# Patient Record
Sex: FEMALE | Race: WHITE | Employment: OTHER | URBAN - METROPOLITAN AREA
[De-identification: names, ages, dates, MRNs, and addresses within clinical notes are randomized per-mention and may not be internally consistent; named-entity substitution may affect disease eponyms.]

---

## 2018-03-15 ENCOUNTER — APPOINTMENT (OUTPATIENT)
Dept: CT IMAGING | Age: 73
DRG: 189 | End: 2018-03-15
Payer: MEDICARE

## 2018-03-15 ENCOUNTER — APPOINTMENT (OUTPATIENT)
Dept: GENERAL RADIOLOGY | Age: 73
DRG: 189 | End: 2018-03-15
Payer: MEDICARE

## 2018-03-15 ENCOUNTER — HOSPITAL ENCOUNTER (INPATIENT)
Age: 73
LOS: 3 days | Discharge: HOME | DRG: 189 | End: 2018-03-18
Attending: EMERGENCY MEDICINE | Admitting: INTERNAL MEDICINE
Payer: MEDICARE

## 2018-03-15 DIAGNOSIS — T81.82XA SUBCUTANEOUS EMPHYSEMA RESULTING FROM A PROCEDURE, INITIAL ENCOUNTER: ICD-10-CM

## 2018-03-15 DIAGNOSIS — R09.02 HYPOXIA: Primary | ICD-10-CM

## 2018-03-15 DIAGNOSIS — J18.9 PNEUMONIA DUE TO ORGANISM: ICD-10-CM

## 2018-03-15 PROBLEM — J96.01 ACUTE HYPOXEMIC RESPIRATORY FAILURE (HCC): Status: ACTIVE | Noted: 2018-03-15

## 2018-03-15 PROBLEM — J43.9 EMPHYSEMA LUNG (HCC): Status: ACTIVE | Noted: 2018-03-15

## 2018-03-15 LAB
ALBUMIN SERPL-MCNC: 3.9 G/DL (ref 3.5–5.2)
ALP BLD-CCNC: 70 U/L (ref 35–104)
ALT SERPL-CCNC: 21 U/L (ref 0–32)
ANION GAP SERPL CALCULATED.3IONS-SCNC: 12 MMOL/L (ref 7–16)
AST SERPL-CCNC: 34 U/L (ref 0–31)
BACTERIA: NORMAL /HPF
BASOPHILS ABSOLUTE: 0.04 E9/L (ref 0–0.2)
BASOPHILS RELATIVE PERCENT: 0.7 % (ref 0–2)
BILIRUB SERPL-MCNC: 0.2 MG/DL (ref 0–1.2)
BILIRUBIN URINE: NEGATIVE
BLOOD, URINE: NEGATIVE
BUN BLDV-MCNC: 8 MG/DL (ref 8–23)
CALCIUM SERPL-MCNC: 8.7 MG/DL (ref 8.6–10.2)
CHLORIDE BLD-SCNC: 97 MMOL/L (ref 98–107)
CLARITY: CLEAR
CO2: 28 MMOL/L (ref 22–29)
COLOR: YELLOW
CREAT SERPL-MCNC: 0.7 MG/DL (ref 0.5–1)
D DIMER: 315 NG/ML DDU
EKG ATRIAL RATE: 81 BPM
EKG P AXIS: 65 DEGREES
EKG P-R INTERVAL: 132 MS
EKG Q-T INTERVAL: 400 MS
EKG QRS DURATION: 128 MS
EKG QTC CALCULATION (BAZETT): 464 MS
EKG R AXIS: -72 DEGREES
EKG T AXIS: 54 DEGREES
EKG VENTRICULAR RATE: 81 BPM
EOSINOPHILS ABSOLUTE: 0.04 E9/L (ref 0.05–0.5)
EOSINOPHILS RELATIVE PERCENT: 0.7 % (ref 0–6)
EPITHELIAL CELLS, UA: NORMAL /HPF
GFR AFRICAN AMERICAN: >60
GFR NON-AFRICAN AMERICAN: >60 ML/MIN/1.73
GLUCOSE BLD-MCNC: 107 MG/DL (ref 74–109)
GLUCOSE URINE: NEGATIVE MG/DL
HCT VFR BLD CALC: 40 % (ref 34–48)
HEMOGLOBIN: 12.3 G/DL (ref 11.5–15.5)
IMMATURE GRANULOCYTES #: 0.02 E9/L
IMMATURE GRANULOCYTES %: 0.4 % (ref 0–5)
KETONES, URINE: ABNORMAL MG/DL
LACTIC ACID: 1.4 MMOL/L (ref 0.5–2.2)
LEUKOCYTE ESTERASE, URINE: ABNORMAL
LYMPHOCYTES ABSOLUTE: 0.91 E9/L (ref 1.5–4)
LYMPHOCYTES RELATIVE PERCENT: 16.3 % (ref 20–42)
MCH RBC QN AUTO: 26.7 PG (ref 26–35)
MCHC RBC AUTO-ENTMCNC: 30.8 % (ref 32–34.5)
MCV RBC AUTO: 86.8 FL (ref 80–99.9)
MONOCYTES ABSOLUTE: 0.61 E9/L (ref 0.1–0.95)
MONOCYTES RELATIVE PERCENT: 10.9 % (ref 2–12)
NEUTROPHILS ABSOLUTE: 3.98 E9/L (ref 1.8–7.3)
NEUTROPHILS RELATIVE PERCENT: 71 % (ref 43–80)
NITRITE, URINE: NEGATIVE
PDW BLD-RTO: 16 FL (ref 11.5–15)
PH UA: 5.5 (ref 5–9)
PLATELET # BLD: 307 E9/L (ref 130–450)
PMV BLD AUTO: 10.3 FL (ref 7–12)
POTASSIUM SERPL-SCNC: 3.8 MMOL/L (ref 3.5–5)
PRO-BNP: 135 PG/ML (ref 0–125)
PROTEIN UA: NEGATIVE MG/DL
RBC # BLD: 4.61 E12/L (ref 3.5–5.5)
RBC UA: NORMAL /HPF (ref 0–2)
SODIUM BLD-SCNC: 137 MMOL/L (ref 132–146)
SPECIFIC GRAVITY UA: 1.01 (ref 1–1.03)
TOTAL PROTEIN: 7.6 G/DL (ref 6.4–8.3)
TROPONIN: <0.01 NG/ML (ref 0–0.03)
UROBILINOGEN, URINE: 0.2 E.U./DL
WBC # BLD: 5.6 E9/L (ref 4.5–11.5)
WBC UA: NORMAL /HPF (ref 0–5)

## 2018-03-15 PROCEDURE — 87581 M.PNEUMON DNA AMP PROBE: CPT

## 2018-03-15 PROCEDURE — 93005 ELECTROCARDIOGRAM TRACING: CPT | Performed by: PHYSICIAN ASSISTANT

## 2018-03-15 PROCEDURE — 2060000000 HC ICU INTERMEDIATE R&B

## 2018-03-15 PROCEDURE — 87186 SC STD MICRODIL/AGAR DIL: CPT

## 2018-03-15 PROCEDURE — 36415 COLL VENOUS BLD VENIPUNCTURE: CPT

## 2018-03-15 PROCEDURE — 6360000002 HC RX W HCPCS: Performed by: EMERGENCY MEDICINE

## 2018-03-15 PROCEDURE — 71275 CT ANGIOGRAPHY CHEST: CPT

## 2018-03-15 PROCEDURE — 87040 BLOOD CULTURE FOR BACTERIA: CPT

## 2018-03-15 PROCEDURE — 6370000000 HC RX 637 (ALT 250 FOR IP): Performed by: EMERGENCY MEDICINE

## 2018-03-15 PROCEDURE — 6360000002 HC RX W HCPCS: Performed by: INTERNAL MEDICINE

## 2018-03-15 PROCEDURE — 6370000000 HC RX 637 (ALT 250 FOR IP): Performed by: INTERNAL MEDICINE

## 2018-03-15 PROCEDURE — 87503 INFLUENZA DNA AMP PROB ADDL: CPT

## 2018-03-15 PROCEDURE — 94664 DEMO&/EVAL PT USE INHALER: CPT

## 2018-03-15 PROCEDURE — 87088 URINE BACTERIA CULTURE: CPT

## 2018-03-15 PROCEDURE — 83880 ASSAY OF NATRIURETIC PEPTIDE: CPT

## 2018-03-15 PROCEDURE — 87486 CHLMYD PNEUM DNA AMP PROBE: CPT

## 2018-03-15 PROCEDURE — 81001 URINALYSIS AUTO W/SCOPE: CPT

## 2018-03-15 PROCEDURE — 85025 COMPLETE CBC W/AUTO DIFF WBC: CPT

## 2018-03-15 PROCEDURE — 2580000003 HC RX 258: Performed by: EMERGENCY MEDICINE

## 2018-03-15 PROCEDURE — 6360000004 HC RX CONTRAST MEDICATION: Performed by: RADIOLOGY

## 2018-03-15 PROCEDURE — 71045 X-RAY EXAM CHEST 1 VIEW: CPT

## 2018-03-15 PROCEDURE — 85378 FIBRIN DEGRADE SEMIQUANT: CPT

## 2018-03-15 PROCEDURE — 87501 INFLUENZA DNA AMP PROB 1+: CPT

## 2018-03-15 PROCEDURE — 80053 COMPREHEN METABOLIC PANEL: CPT

## 2018-03-15 PROCEDURE — 83605 ASSAY OF LACTIC ACID: CPT

## 2018-03-15 PROCEDURE — 87798 DETECT AGENT NOS DNA AMP: CPT

## 2018-03-15 PROCEDURE — 96365 THER/PROPH/DIAG IV INF INIT: CPT

## 2018-03-15 PROCEDURE — 2580000003 HC RX 258: Performed by: INTERNAL MEDICINE

## 2018-03-15 PROCEDURE — 87077 CULTURE AEROBIC IDENTIFY: CPT

## 2018-03-15 PROCEDURE — 84484 ASSAY OF TROPONIN QUANT: CPT

## 2018-03-15 PROCEDURE — 99285 EMERGENCY DEPT VISIT HI MDM: CPT

## 2018-03-15 RX ORDER — ONDANSETRON 2 MG/ML
4 INJECTION INTRAMUSCULAR; INTRAVENOUS EVERY 6 HOURS PRN
Status: DISCONTINUED | OUTPATIENT
Start: 2018-03-15 | End: 2018-03-18 | Stop reason: HOSPADM

## 2018-03-15 RX ORDER — GUAIFENESIN 100 MG/5ML
200 SOLUTION ORAL EVERY 4 HOURS PRN
COMMUNITY
End: 2019-03-27

## 2018-03-15 RX ORDER — METHYLPREDNISOLONE SODIUM SUCCINATE 40 MG/ML
40 INJECTION, POWDER, LYOPHILIZED, FOR SOLUTION INTRAMUSCULAR; INTRAVENOUS EVERY 6 HOURS
Status: DISCONTINUED | OUTPATIENT
Start: 2018-03-15 | End: 2018-03-16

## 2018-03-15 RX ORDER — ACETAMINOPHEN 325 MG/1
650 TABLET ORAL EVERY 4 HOURS PRN
Status: DISCONTINUED | OUTPATIENT
Start: 2018-03-15 | End: 2018-03-18 | Stop reason: HOSPADM

## 2018-03-15 RX ORDER — ASPIRIN 81 MG/1
81 TABLET ORAL EVERY OTHER DAY
Status: DISCONTINUED | OUTPATIENT
Start: 2018-03-16 | End: 2018-03-18 | Stop reason: HOSPADM

## 2018-03-15 RX ORDER — GUAIFENESIN 100 MG/5ML
200 SOLUTION ORAL EVERY 4 HOURS PRN
Status: DISCONTINUED | OUTPATIENT
Start: 2018-03-15 | End: 2018-03-18 | Stop reason: HOSPADM

## 2018-03-15 RX ORDER — SODIUM CHLORIDE 0.9 % (FLUSH) 0.9 %
10 SYRINGE (ML) INJECTION PRN
Status: DISCONTINUED | OUTPATIENT
Start: 2018-03-15 | End: 2018-03-18 | Stop reason: HOSPADM

## 2018-03-15 RX ORDER — CHOLECALCIFEROL (VITAMIN D3) 50 MCG
4000 TABLET ORAL EVERY MORNING
Status: DISCONTINUED | OUTPATIENT
Start: 2018-03-16 | End: 2018-03-18 | Stop reason: HOSPADM

## 2018-03-15 RX ORDER — ASPIRIN 81 MG/1
81 TABLET ORAL EVERY OTHER DAY
COMMUNITY
End: 2019-06-06

## 2018-03-15 RX ORDER — ACETAMINOPHEN 160 MG
2 TABLET,DISINTEGRATING ORAL EVERY MORNING
COMMUNITY

## 2018-03-15 RX ORDER — IPRATROPIUM BROMIDE AND ALBUTEROL SULFATE 2.5; .5 MG/3ML; MG/3ML
1 SOLUTION RESPIRATORY (INHALATION) ONCE
Status: COMPLETED | OUTPATIENT
Start: 2018-03-15 | End: 2018-03-15

## 2018-03-15 RX ORDER — ESTRADIOL 2 MG/1
2 TABLET ORAL EVERY MORNING
Status: DISCONTINUED | OUTPATIENT
Start: 2018-03-16 | End: 2018-03-18 | Stop reason: HOSPADM

## 2018-03-15 RX ORDER — IPRATROPIUM BROMIDE AND ALBUTEROL SULFATE 2.5; .5 MG/3ML; MG/3ML
1 SOLUTION RESPIRATORY (INHALATION)
Status: DISCONTINUED | OUTPATIENT
Start: 2018-03-15 | End: 2018-03-18 | Stop reason: HOSPADM

## 2018-03-15 RX ORDER — SODIUM CHLORIDE 0.9 % (FLUSH) 0.9 %
10 SYRINGE (ML) INJECTION EVERY 12 HOURS SCHEDULED
Status: DISCONTINUED | OUTPATIENT
Start: 2018-03-15 | End: 2018-03-18 | Stop reason: HOSPADM

## 2018-03-15 RX ORDER — ESTRADIOL 2 MG/1
2 TABLET ORAL EVERY MORNING
COMMUNITY
End: 2019-06-06

## 2018-03-15 RX ORDER — 0.9 % SODIUM CHLORIDE 0.9 %
1000 INTRAVENOUS SOLUTION INTRAVENOUS ONCE
Status: COMPLETED | OUTPATIENT
Start: 2018-03-15 | End: 2018-03-15

## 2018-03-15 RX ORDER — RAMIPRIL 5 MG/1
5 CAPSULE ORAL EVERY MORNING
Status: DISCONTINUED | OUTPATIENT
Start: 2018-03-16 | End: 2018-03-18 | Stop reason: HOSPADM

## 2018-03-15 RX ORDER — OSELTAMIVIR PHOSPHATE 75 MG/1
75 CAPSULE ORAL ONCE
Status: COMPLETED | OUTPATIENT
Start: 2018-03-15 | End: 2018-03-15

## 2018-03-15 RX ORDER — ALBUTEROL SULFATE 90 UG/1
2 AEROSOL, METERED RESPIRATORY (INHALATION) 4 TIMES DAILY
COMMUNITY

## 2018-03-15 RX ORDER — RAMIPRIL 5 MG/1
5 CAPSULE ORAL EVERY MORNING
COMMUNITY

## 2018-03-15 RX ADMIN — IOPAMIDOL 60 ML: 755 INJECTION, SOLUTION INTRAVENOUS at 16:45

## 2018-03-15 RX ADMIN — METHYLPREDNISOLONE SODIUM SUCCINATE 40 MG: 40 INJECTION, POWDER, LYOPHILIZED, FOR SOLUTION INTRAMUSCULAR; INTRAVENOUS at 21:48

## 2018-03-15 RX ADMIN — ENOXAPARIN SODIUM 40 MG: 40 INJECTION, SOLUTION INTRAVENOUS; SUBCUTANEOUS at 21:48

## 2018-03-15 RX ADMIN — SODIUM CHLORIDE 1000 ML: 9 INJECTION, SOLUTION INTRAVENOUS at 15:14

## 2018-03-15 RX ADMIN — AZITHROMYCIN MONOHYDRATE 500 MG: 500 INJECTION, POWDER, LYOPHILIZED, FOR SOLUTION INTRAVENOUS at 18:04

## 2018-03-15 RX ADMIN — IPRATROPIUM BROMIDE AND ALBUTEROL SULFATE 1 AMPULE: .5; 3 SOLUTION RESPIRATORY (INHALATION) at 20:47

## 2018-03-15 RX ADMIN — OSELTAMIVIR PHOSPHATE 75 MG: 75 CAPSULE ORAL at 21:48

## 2018-03-15 RX ADMIN — Medication 10 ML: at 21:49

## 2018-03-15 RX ADMIN — IPRATROPIUM BROMIDE AND ALBUTEROL SULFATE 1 AMPULE: .5; 3 SOLUTION RESPIRATORY (INHALATION) at 14:35

## 2018-03-15 RX ADMIN — CEFTRIAXONE 1 G: 1 INJECTION, POWDER, FOR SOLUTION INTRAMUSCULAR; INTRAVENOUS at 15:40

## 2018-03-15 NOTE — ED NOTES
FIRST PROVIDER CONTACT ASSESSMENT NOTE      Department of Emergency Medicine   3/15/18  12:40 PM    Chief Complaint: Shortness of Breath (recently diagnosed with influenza A and was started on Keflex, had isues w/ blood in stool so was just swithced to Z-gia today, but has not started yet); Fever (101.2); and Cough (intermittently productive of yellow )      History of Present Illness: Maliha Mora is a 71 y.o. female who presents to the ED by private car for Shortness of breath. States she was diagnosed with influenza. She has underlying emphysema. Was started on Keflex but states they stopped it because it was causing her to have bleeding in her stools. Now with shortness of breath. Getting worse with exertion. Focused Screening Exam:  Constitutional:  Alert, appears stated age and is in mild distress    *ALLERGIES*     Patient has no allergy information on record.      ED Triage Vitals   BP Temp Temp src Pulse Resp SpO2 Height Weight   -- -- -- -- -- -- -- --        Initial Plan of Care:  Initiate Treatment-Testing, Proceed toTreatment Area When Bed Available for ED Attending/MLP to Continue Care    -----------------END OF FIRST PROVIDER CONTACT ASSESSMENT NOTE--------------  Electronically signed by Charbel Daniels PA-C   DD: 3/15/18       Charbel Daniels PA-C  03/15/18 [id number]

## 2018-03-15 NOTE — PLAN OF CARE
Problem: Respiratory Function - Compromised  Goal: Able to breathe comfortably  Able to breathe comfortably    Outcome: Met This Shift

## 2018-03-15 NOTE — ED PROVIDER NOTES
in her mother; Diabetes in her sister; Heart Disease in her father. The patients home medications have been reviewed. Allergies: Ciprofloxacin and Prevnar 13 [pneumococcal 13-delfino conj vacc]      ---------------------------------------------------PHYSICAL EXAM--------------------------------------    Constitutional/General: Alert and oriented x3, well appearing, non toxic in NAD  Head: Normocephalic and atraumatic  Eyes: pupils dilated and reactive bilaterally, EOMI. Mouth: Oropharynx clear, mucous membranes dry. Neck: Supple. Full ROM. Respiratory: Lungs are diminished bilaterally with rhonchi throughout. Cardiovascular:  Regular rate. Regular rhythm. No murmurs, gallops, or rubs. 2+ distal pulses  GI:  Abdomen Soft, Non tender, Non distended. No rebound, guarding, or rigidity. Hyperactive bowel sounds. Musculoskeletal: Moves all extremities x 4. Warm and well perfused. No edema. Integument: skin warm and dry. No rashes. Neurologic: GCS 15, 5/5 strength.   -------------------------------------------------- RESULTS -------------------------------------------------  I have personally reviewed all laboratory and imaging results for this patient. Results are listed below.      LABS:  Results for orders placed or performed during the hospital encounter of 03/15/18   Culture Blood #1   Result Value Ref Range    Blood Culture, Routine 24 Hours- no growth    Culture Blood #2   Result Value Ref Range    Culture, Blood 2 24 Hours- no growth    Respiratory Panel, Film Array   Result Value Ref Range    Film Array Adenovirus       Result: Not Detected  *  *  Normal Range: Not Detected      Film Array Coronavirus HKU1       Result: Not Detected  *  *  Normal Range: Not Detected      Film Array Conoravirus NL63       Result: Not Detected  *  *  Normal Range: Not Detected      Film Array Coronavirus 229E       Result: Not Detected  *  *  Normal Range: Not Detected      Film Array Coronavirus OC43 - 5.0 %    Lymphocytes % 16.3 (L) 20.0 - 42.0 %    Monocytes % 10.9 2.0 - 12.0 %    Eosinophils % 0.7 0.0 - 6.0 %    Basophils % 0.7 0.0 - 2.0 %    Neutrophils # 3.98 1.80 - 7.30 E9/L    Immature Granulocytes # 0.02 E9/L    Lymphocytes # 0.91 (L) 1.50 - 4.00 E9/L    Monocytes # 0.61 0.10 - 0.95 E9/L    Eosinophils # 0.04 (L) 0.05 - 0.50 E9/L    Basophils # 0.04 0.00 - 0.20 E9/L   Comprehensive Metabolic Panel   Result Value Ref Range    Sodium 137 132 - 146 mmol/L    Potassium 3.8 3.5 - 5.0 mmol/L    Chloride 97 (L) 98 - 107 mmol/L    CO2 28 22 - 29 mmol/L    Anion Gap 12 7 - 16 mmol/L    Glucose 107 74 - 109 mg/dL    BUN 8 8 - 23 mg/dL    CREATININE 0.7 0.5 - 1.0 mg/dL    GFR Non-African American >60 >=60 mL/min/1.73    GFR African American >60     Calcium 8.7 8.6 - 10.2 mg/dL    Total Protein 7.6 6.4 - 8.3 g/dL    Alb 3.9 3.5 - 5.2 g/dL    Total Bilirubin 0.2 0.0 - 1.2 mg/dL    Alkaline Phosphatase 70 35 - 104 U/L    ALT 21 0 - 32 U/L    AST 34 (H) 0 - 31 U/L   Troponin   Result Value Ref Range    Troponin <0.01 0.00 - 0.03 ng/mL   Lactic Acid, Plasma   Result Value Ref Range    Lactic Acid 1.4 0.5 - 2.2 mmol/L   Urinalysis   Result Value Ref Range    Color, UA Yellow Straw/Yellow    Clarity, UA Clear Clear    Glucose, Ur Negative Negative mg/dL    Bilirubin Urine Negative Negative    Ketones, Urine TRACE (A) Negative mg/dL    Specific Gravity, UA 1.010 1.005 - 1.030    Blood, Urine Negative Negative    pH, UA 5.5 5.0 - 9.0    Protein, UA Negative Negative mg/dL    Urobilinogen, Urine 0.2 <2.0 E.U./dL    Nitrite, Urine Negative Negative    Leukocyte Esterase, Urine SMALL (A) Negative   Brain Natriuretic Peptide   Result Value Ref Range    Pro- (H) 0 - 125 pg/mL   D-Dimer, Quantitative   Result Value Ref Range    D-Dimer, Quant 315 ng/mL DDU   Microscopic Urinalysis   Result Value Ref Range    WBC, UA 0-1 0 - 5 /HPF    RBC, UA NONE 0 - 2 /HPF    Epi Cells FEW /HPF    Bacteria, UA NONE /HPF   EKG 12 Lead solution 200 mg (200 mg Oral Given 3/17/18 0207)   ramipril (ALTACE) capsule 5 mg (5 mg Oral Given 3/17/18 0928)   sodium chloride flush 0.9 % injection 10 mL (10 mLs Intravenous Given 3/17/18 0930)   sodium chloride flush 0.9 % injection 10 mL (10 mLs Intravenous Given 3/16/18 1711)   acetaminophen (TYLENOL) tablet 650 mg (not administered)   magnesium hydroxide (MILK OF MAGNESIA) 400 MG/5ML suspension 30 mL (not administered)   ondansetron (ZOFRAN) injection 4 mg (4 mg Intravenous Given 3/16/18 2044)   enoxaparin (LOVENOX) injection 40 mg (40 mg Subcutaneous Given 3/16/18 2035)   lactobacillus (CULTURELLE) capsule 1 capsule (1 capsule Oral Given 3/17/18 0928)   sodium chloride (OCEAN, BABY AYR) 0.65 % nasal spray 2 spray (not administered)   budesonide (PULMICORT) nebulizer suspension 500 mcg (500 mcg Nebulization Given 3/17/18 0916)   formoterol (PERFOROMIST) nebulizer solution 20 mcg (20 mcg Nebulization Given 3/17/18 0916)   methylPREDNISolone sodium (SOLU-MEDROL) injection 40 mg (not administered)   guaiFENesin tablet 400 mg (not administered)   ipratropium-albuterol (DUONEB) nebulizer solution 1 ampule (1 ampule Inhalation Given 3/15/18 1435)   azithromycin (ZITHROMAX) 500 mg in D5W 250ml addavial (0 mg Intravenous Stopped 3/15/18 1904)   cefTRIAXone (ROCEPHIN) 1 g in dextrose 5 % 50 mL IVPB (vial-mate) (0 g Intravenous Stopped 3/15/18 1700)   0.9 % sodium chloride bolus (0 mLs Intravenous Stopped 3/15/18 1747)   iopamidol (ISOVUE-370) 76 % injection 60 mL (60 mLs Intravenous Given 3/15/18 1645)   oseltamivir (TAMIFLU) capsule 75 mg (75 mg Oral Given 3/15/18 2148)   cefTRIAXone (ROCEPHIN) 1 g in dextrose 5 % 50 mL IVPB (vial-mate) (0 g Intravenous Stopped 3/16/18 1315)       Medical Decision Making:    Patient arrived in the ED for SOB with exertion. Duoneb, azithromycin, and ceftriaxone were given. XR of the chest ordered. Lab work ordered. HHN treatment given. EKG ordered.   Patient desaturated to 78%

## 2018-03-15 NOTE — ED NOTES
Martha Villeda, LPN instructed of need for oxygen at 2L and need for EKG.       Melly Lopes RN  03/15/18 1931

## 2018-03-16 PROBLEM — R09.02 HYPOXIA: Status: RESOLVED | Noted: 2018-03-15 | Resolved: 2018-03-16

## 2018-03-16 PROBLEM — J43.9 EMPHYSEMA LUNG (HCC): Status: RESOLVED | Noted: 2018-03-15 | Resolved: 2018-03-16

## 2018-03-16 PROBLEM — R91.1 PULMONARY NODULE SEEN ON IMAGING STUDY: Chronic | Status: ACTIVE | Noted: 2018-03-16

## 2018-03-16 PROBLEM — I10 ESSENTIAL HYPERTENSION: Chronic | Status: ACTIVE | Noted: 2018-03-16

## 2018-03-16 PROBLEM — J44.9 COPD (CHRONIC OBSTRUCTIVE PULMONARY DISEASE) (HCC): Chronic | Status: ACTIVE | Noted: 2018-03-16

## 2018-03-16 LAB
FILM ARRAY ADENOVIRUS: NORMAL
FILM ARRAY BORDETELLA PERTUSSIS: NORMAL
FILM ARRAY CHLAMYDOPHILIA PNEUMONIAE: NORMAL
FILM ARRAY CORONAVIRUS 229E: NORMAL
FILM ARRAY CORONAVIRUS HKU1: NORMAL
FILM ARRAY CORONAVIRUS NL63: NORMAL
FILM ARRAY CORONAVIRUS OC43: NORMAL
FILM ARRAY INFLUENZA A VIRUS 09H1: NORMAL
FILM ARRAY INFLUENZA A VIRUS H1: NORMAL
FILM ARRAY INFLUENZA A VIRUS H3: NORMAL
FILM ARRAY INFLUENZA A VIRUS: NORMAL
FILM ARRAY INFLUENZA B: NORMAL
FILM ARRAY METAPNEUMOVIRUS: NORMAL
FILM ARRAY MYCOPLASMA PNEUMONIAE: NORMAL
FILM ARRAY PARAINFLUENZA VIRUS 1: NORMAL
FILM ARRAY PARAINFLUENZA VIRUS 2: NORMAL
FILM ARRAY PARAINFLUENZA VIRUS 3: NORMAL
FILM ARRAY PARAINFLUENZA VIRUS 4: NORMAL
FILM ARRAY RESPIRATORY SYNCITIAL VIRUS: NORMAL
FILM ARRAY RHINOVIRUS/ENTEROVIRUS: NORMAL

## 2018-03-16 PROCEDURE — 6370000000 HC RX 637 (ALT 250 FOR IP): Performed by: INTERNAL MEDICINE

## 2018-03-16 PROCEDURE — 6360000002 HC RX W HCPCS: Performed by: INTERNAL MEDICINE

## 2018-03-16 PROCEDURE — 94640 AIRWAY INHALATION TREATMENT: CPT

## 2018-03-16 PROCEDURE — 2700000000 HC OXYGEN THERAPY PER DAY

## 2018-03-16 PROCEDURE — 2060000000 HC ICU INTERMEDIATE R&B

## 2018-03-16 PROCEDURE — 2580000003 HC RX 258: Performed by: INTERNAL MEDICINE

## 2018-03-16 RX ORDER — LACTOBACILLUS RHAMNOSUS GG 10B CELL
1 CAPSULE ORAL DAILY
Status: DISCONTINUED | OUTPATIENT
Start: 2018-03-16 | End: 2018-03-18 | Stop reason: HOSPADM

## 2018-03-16 RX ORDER — PREDNISONE 20 MG/1
40 TABLET ORAL DAILY
Status: DISCONTINUED | OUTPATIENT
Start: 2018-03-17 | End: 2018-03-17

## 2018-03-16 RX ORDER — PREDNISONE 20 MG/1
40 TABLET ORAL DAILY
Qty: 8 TABLET | Refills: 0 | Status: SHIPPED | OUTPATIENT
Start: 2018-03-16 | End: 2018-03-18

## 2018-03-16 RX ORDER — PREDNISONE 20 MG/1
40 TABLET ORAL DAILY
Status: DISCONTINUED | OUTPATIENT
Start: 2018-03-16 | End: 2018-03-16 | Stop reason: SDUPTHER

## 2018-03-16 RX ADMIN — Medication 10 ML: at 20:35

## 2018-03-16 RX ADMIN — CHOLECALCIFEROL TAB 50 MCG (2000 UNIT) 4000 UNITS: 50 TAB at 09:24

## 2018-03-16 RX ADMIN — Medication 1 CAPSULE: at 12:37

## 2018-03-16 RX ADMIN — DEXTROSE MONOHYDRATE 500 MG: 50 INJECTION, SOLUTION INTRAVENOUS at 17:11

## 2018-03-16 RX ADMIN — ASPIRIN 81 MG: 81 TABLET ORAL at 09:26

## 2018-03-16 RX ADMIN — ENOXAPARIN SODIUM 40 MG: 40 INJECTION, SOLUTION INTRAVENOUS; SUBCUTANEOUS at 20:35

## 2018-03-16 RX ADMIN — GUAIFENESIN 200 MG: 100 SOLUTION ORAL at 20:44

## 2018-03-16 RX ADMIN — Medication 10 ML: at 17:11

## 2018-03-16 RX ADMIN — ESTRADIOL 2 MG: 2 TABLET ORAL at 09:23

## 2018-03-16 RX ADMIN — Medication 10 ML: at 09:24

## 2018-03-16 RX ADMIN — METHYLPREDNISOLONE SODIUM SUCCINATE 40 MG: 40 INJECTION, POWDER, LYOPHILIZED, FOR SOLUTION INTRAMUSCULAR; INTRAVENOUS at 09:22

## 2018-03-16 RX ADMIN — IPRATROPIUM BROMIDE AND ALBUTEROL SULFATE 1 AMPULE: .5; 3 SOLUTION RESPIRATORY (INHALATION) at 17:36

## 2018-03-16 RX ADMIN — RAMIPRIL 5 MG: 5 CAPSULE ORAL at 09:24

## 2018-03-16 RX ADMIN — IPRATROPIUM BROMIDE AND ALBUTEROL SULFATE 1 AMPULE: .5; 3 SOLUTION RESPIRATORY (INHALATION) at 09:17

## 2018-03-16 RX ADMIN — CEFTRIAXONE 1 G: 1 INJECTION, POWDER, FOR SOLUTION INTRAMUSCULAR; INTRAVENOUS at 12:36

## 2018-03-16 RX ADMIN — ONDANSETRON 4 MG: 2 INJECTION INTRAMUSCULAR; INTRAVENOUS at 20:44

## 2018-03-16 RX ADMIN — METHYLPREDNISOLONE SODIUM SUCCINATE 40 MG: 40 INJECTION, POWDER, LYOPHILIZED, FOR SOLUTION INTRAMUSCULAR; INTRAVENOUS at 02:35

## 2018-03-16 RX ADMIN — IPRATROPIUM BROMIDE AND ALBUTEROL SULFATE 1 AMPULE: .5; 3 SOLUTION RESPIRATORY (INHALATION) at 12:39

## 2018-03-16 RX ADMIN — IPRATROPIUM BROMIDE AND ALBUTEROL SULFATE 1 AMPULE: .5; 3 SOLUTION RESPIRATORY (INHALATION) at 21:40

## 2018-03-16 RX ADMIN — Medication 10 ML: at 12:49

## 2018-03-16 NOTE — PROGRESS NOTES
Patient's SpO2 on room air at rest 95%. Patient ambulated 75 feet and SpO2 dropped to 84% on room air with a pulse of 116. Patient placed on 2L nasal cannula and recovered to 93% with a pulse of 92 with ambulation.

## 2018-03-16 NOTE — CONSULTS
quit smoking about 16 years ago. Her smoking use included Cigarettes. She started smoking about 58 years ago. She smoked 1.00 pack per day. She has never used smokeless tobacco. She reports that she does not drink alcohol or use drugs. family history includes Colon Cancer in her mother; Diabetes in her sister; Heart Disease in her father. Allergies Ciprofloxacin and Prevnar 13 [pneumococcal 13-delfino conj vacc]    ROS:  General: + fever, chilling, general mallaise  Neuro: + headache  Integumentary: w/o rash or pruritis  Respiratory: + cough and mucus (1 to 2 teasp or less per day) chronically. + wheeze, now more chest tightness and more short of breath  Cardiovascular: - chest pain, palpitations, edema. + hypertension  GI: chronic diarrhea since first colonoscopy about 20 years ago - mostly postprandial  : no complaint of dysuria  Endocrine: no known thyroid history or diabetes  Musculosckelatal: + ache muscles      Vitals:    03/16/18 1515   BP: (!) 125/53   Pulse: 95   Resp: 18   Temp: 97.1 °F (36.2 °C)   SpO2: 93%       Intake/Output Summary (Last 24 hours) at 03/16/18 1846  Last data filed at 03/16/18 0600   Gross per 24 hour   Intake              370 ml   Output             1250 ml   Net             -880 ml       Exam/Objective:   WD thin female in no acute distress  Skin: warm, dry, pale, without rash  HEENT: PERRL, face symmetric, normal moist oral mucosa, + dental plates No neck mass, adenopathy, or thyromegaly  Chest: symmetric with equal air entry. Hyper-resonance to percussion. Lungs: decreased slightly coarse breath sounds with bibasilar posterior exp wheezes  Cardiac: nromal PMI, S1, S2 normal.  No murmur or ryan  Abdomen: rounded, soft, non-tender, active bowel sounds. No mass or hepatosplenomegaly  Extremities: No clubbing, cyanosis, or edema  Neuro: CN II - XII grossly intact. Alert, moves all extremities symmetrically.  Oriented to person, time place  Psych:  Normal affect and

## 2018-03-16 NOTE — H&P
Cancer in her mother; Diabetes in her sister; Heart Disease in her father. REVIEW OF SYSTEMS:  As above in the HPI, otherwise negative    PHYSICAL EXAM:    Vitals:  BP (!) 127/54   Pulse 98   Temp 97.7 °F (36.5 °C) (Oral)   Resp 22   Ht 4' 11\" (1.499 m)   Wt 121 lb (54.9 kg)   SpO2 94%   BMI 24.44 kg/m²     General:  Awake, alert, oriented X 3. Well developed, well nourished, well groomed. No apparent distress. HEENT:  Normocephalic, atraumatic. Pupils equal, round, reactive to light. No scleral icterus. No conjunctival injection. Normal lips, teeth, and gums. No nasal discharge. Neck:  Supple  Heart:  RRR, no murmurs, gallops, rubs  Lungs:  CTA bilaterally, bilat symmetrical expansion, no wheeze, rales, or rhonchi  Abdomen:   Bowel sounds present, soft, nontender, no masses, no organomegaly, no peritoneal signs  Extremities:  No clubbing, cyanosis, or edema  Skin:  Warm and dry, no open lesions or rash  Neuro:  Cranial nerves 2-12 intact, no focal deficits  Breast: deferred  Rectal: deferred  Genitalia:  deferred    LABS:    CBC with Differential:    Lab Results   Component Value Date    WBC 5.6 03/15/2018    RBC 4.61 03/15/2018    HGB 12.3 03/15/2018    HCT 40.0 03/15/2018     03/15/2018    MCV 86.8 03/15/2018    MCH 26.7 03/15/2018    MCHC 30.8 03/15/2018    RDW 16.0 03/15/2018    LYMPHOPCT 16.3 03/15/2018    MONOPCT 10.9 03/15/2018    BASOPCT 0.7 03/15/2018    MONOSABS 0.61 03/15/2018    LYMPHSABS 0.91 03/15/2018    EOSABS 0.04 03/15/2018    BASOSABS 0.04 03/15/2018     CMP:    Lab Results   Component Value Date     03/15/2018    K 3.8 03/15/2018    CL 97 03/15/2018    CO2 28 03/15/2018    BUN 8 03/15/2018    CREATININE 0.7 03/15/2018    GFRAA >60 03/15/2018    LABGLOM >60 03/15/2018    GLUCOSE 107 03/15/2018    PROT 7.6 03/15/2018    LABALBU 3.9 03/15/2018    CALCIUM 8.7 03/15/2018    BILITOT 0.2 03/15/2018    ALKPHOS 70 03/15/2018    AST 34 03/15/2018    ALT 21 03/15/2018

## 2018-03-17 PROCEDURE — 6370000000 HC RX 637 (ALT 250 FOR IP): Performed by: INTERNAL MEDICINE

## 2018-03-17 PROCEDURE — 2060000000 HC ICU INTERMEDIATE R&B

## 2018-03-17 PROCEDURE — 6360000002 HC RX W HCPCS: Performed by: INTERNAL MEDICINE

## 2018-03-17 PROCEDURE — 94664 DEMO&/EVAL PT USE INHALER: CPT

## 2018-03-17 PROCEDURE — 2580000003 HC RX 258: Performed by: INTERNAL MEDICINE

## 2018-03-17 PROCEDURE — 87070 CULTURE OTHR SPECIMN AEROBIC: CPT

## 2018-03-17 PROCEDURE — 2700000000 HC OXYGEN THERAPY PER DAY

## 2018-03-17 PROCEDURE — 94640 AIRWAY INHALATION TREATMENT: CPT

## 2018-03-17 RX ORDER — METHYLPREDNISOLONE SODIUM SUCCINATE 40 MG/ML
40 INJECTION, POWDER, LYOPHILIZED, FOR SOLUTION INTRAMUSCULAR; INTRAVENOUS EVERY 12 HOURS
Status: DISCONTINUED | OUTPATIENT
Start: 2018-03-17 | End: 2018-03-18

## 2018-03-17 RX ORDER — BUDESONIDE 0.5 MG/2ML
500 INHALANT ORAL 2 TIMES DAILY
Status: DISCONTINUED | OUTPATIENT
Start: 2018-03-17 | End: 2018-03-18 | Stop reason: HOSPADM

## 2018-03-17 RX ORDER — GUAIFENESIN 600 MG/1
600 TABLET, EXTENDED RELEASE ORAL 2 TIMES DAILY
Status: DISCONTINUED | OUTPATIENT
Start: 2018-03-17 | End: 2018-03-17 | Stop reason: CLARIF

## 2018-03-17 RX ORDER — FORMOTEROL FUMARATE 20 UG/2ML
20 SOLUTION RESPIRATORY (INHALATION) EVERY 12 HOURS SCHEDULED
Status: DISCONTINUED | OUTPATIENT
Start: 2018-03-17 | End: 2018-03-18 | Stop reason: HOSPADM

## 2018-03-17 RX ORDER — GUAIFENESIN 400 MG/1
400 TABLET ORAL 3 TIMES DAILY
Status: DISCONTINUED | OUTPATIENT
Start: 2018-03-17 | End: 2018-03-18 | Stop reason: HOSPADM

## 2018-03-17 RX ADMIN — BUDESONIDE 500 MCG: 0.5 SUSPENSION RESPIRATORY (INHALATION) at 09:16

## 2018-03-17 RX ADMIN — DEXTROSE MONOHYDRATE 500 MG: 50 INJECTION, SOLUTION INTRAVENOUS at 17:30

## 2018-03-17 RX ADMIN — FORMOTEROL FUMARATE DIHYDRATE 20 MCG: 20 SOLUTION RESPIRATORY (INHALATION) at 20:42

## 2018-03-17 RX ADMIN — FORMOTEROL FUMARATE DIHYDRATE 20 MCG: 20 SOLUTION RESPIRATORY (INHALATION) at 09:16

## 2018-03-17 RX ADMIN — METHYLPREDNISOLONE SODIUM SUCCINATE 40 MG: 40 INJECTION, POWDER, LYOPHILIZED, FOR SOLUTION INTRAMUSCULAR; INTRAVENOUS at 20:45

## 2018-03-17 RX ADMIN — RAMIPRIL 5 MG: 5 CAPSULE ORAL at 09:28

## 2018-03-17 RX ADMIN — Medication 10 ML: at 09:30

## 2018-03-17 RX ADMIN — IPRATROPIUM BROMIDE AND ALBUTEROL SULFATE 1 AMPULE: .5; 3 SOLUTION RESPIRATORY (INHALATION) at 13:19

## 2018-03-17 RX ADMIN — Medication 1 CAPSULE: at 09:28

## 2018-03-17 RX ADMIN — GUAIFENESIN 400 MG: 400 TABLET ORAL at 20:02

## 2018-03-17 RX ADMIN — IPRATROPIUM BROMIDE AND ALBUTEROL SULFATE 1 AMPULE: .5; 3 SOLUTION RESPIRATORY (INHALATION) at 16:27

## 2018-03-17 RX ADMIN — ACETAMINOPHEN 650 MG: 325 TABLET ORAL at 18:01

## 2018-03-17 RX ADMIN — Medication 10 ML: at 20:02

## 2018-03-17 RX ADMIN — Medication 10 ML: at 10:43

## 2018-03-17 RX ADMIN — GUAIFENESIN 400 MG: 400 TABLET ORAL at 15:08

## 2018-03-17 RX ADMIN — ESTRADIOL 2 MG: 2 TABLET ORAL at 09:29

## 2018-03-17 RX ADMIN — ENOXAPARIN SODIUM 40 MG: 40 INJECTION, SOLUTION INTRAVENOUS; SUBCUTANEOUS at 20:02

## 2018-03-17 RX ADMIN — GUAIFENESIN 200 MG: 100 SOLUTION ORAL at 02:07

## 2018-03-17 RX ADMIN — CHOLECALCIFEROL TAB 50 MCG (2000 UNIT) 4000 UNITS: 50 TAB at 09:24

## 2018-03-17 RX ADMIN — METHYLPREDNISOLONE SODIUM SUCCINATE 40 MG: 40 INJECTION, POWDER, LYOPHILIZED, FOR SOLUTION INTRAMUSCULAR; INTRAVENOUS at 10:42

## 2018-03-17 RX ADMIN — BUDESONIDE 500 MCG: 0.5 SUSPENSION RESPIRATORY (INHALATION) at 20:42

## 2018-03-17 ASSESSMENT — PAIN DESCRIPTION - LOCATION: LOCATION: ARM

## 2018-03-17 ASSESSMENT — PAIN SCALES - GENERAL
PAINLEVEL_OUTOF10: 0
PAINLEVEL_OUTOF10: 0
PAINLEVEL_OUTOF10: 10

## 2018-03-17 ASSESSMENT — PAIN DESCRIPTION - PAIN TYPE: TYPE: ACUTE PAIN

## 2018-03-17 ASSESSMENT — PAIN DESCRIPTION - ORIENTATION: ORIENTATION: LEFT

## 2018-03-17 ASSESSMENT — PAIN DESCRIPTION - DESCRIPTORS: DESCRIPTORS: BURNING

## 2018-03-17 ASSESSMENT — PAIN DESCRIPTION - FREQUENCY: FREQUENCY: CONTINUOUS

## 2018-03-17 NOTE — PROGRESS NOTES
Subjective:    Covering Dr. Yokasta Silva. Case discussed in detail with him/chart reviewed. The patient is awake and alert. Feels better. Less short of breath, but still with wheezing. Had some nausea yesterday, but feels better this AM.  Tolerating diet. Objective:    BP (!) 144/66   Pulse 75   Temp 99.1 °F (37.3 °C) (Oral)   Resp 20   Ht 4' 11\" (1.499 m)   Wt 123 lb 6.4 oz (56 kg)   SpO2 94%   BMI 24.92 kg/m²     Current medications that patient is taking have been reviewed. Heart:  RRR, no murmurs, gallops, or rubs.   Lungs:  Diminished bilaterally, faint expiratory wheeze bilaterally, no rales  Abd: bowel sounds present, soft, nontender, nondistended, no masses  Extrem:  No clubbing, cyanosis, or edema    Cultures negative  Monitor-sinus     Assessment:    Patient Active Problem List   Diagnosis    Acute hypoxemic respiratory failure secondary to COPD with acute exacerbation, likely due to recent viral illness    Essential hypertension, benign       Plan:    1) continue aerosols  2) add inhaled steroids  3) continue IV steroids another 24 hours  4) ok to transfer to general floor  5) ?home 24 hours if she continues to improve clinically      Claribel Romero MD  8:50 AM  3/17/2018

## 2018-03-18 VITALS
RESPIRATION RATE: 18 BRPM | SYSTOLIC BLOOD PRESSURE: 139 MMHG | BODY MASS INDEX: 25.02 KG/M2 | HEART RATE: 88 BPM | HEIGHT: 59 IN | DIASTOLIC BLOOD PRESSURE: 62 MMHG | WEIGHT: 124.1 LBS | OXYGEN SATURATION: 94 % | TEMPERATURE: 97.7 F

## 2018-03-18 LAB
ORGANISM: ABNORMAL
URINE CULTURE, ROUTINE: ABNORMAL
URINE CULTURE, ROUTINE: ABNORMAL

## 2018-03-18 PROCEDURE — 6370000000 HC RX 637 (ALT 250 FOR IP): Performed by: INTERNAL MEDICINE

## 2018-03-18 PROCEDURE — 2700000000 HC OXYGEN THERAPY PER DAY

## 2018-03-18 PROCEDURE — 2580000003 HC RX 258: Performed by: INTERNAL MEDICINE

## 2018-03-18 PROCEDURE — 94640 AIRWAY INHALATION TREATMENT: CPT

## 2018-03-18 RX ORDER — PREDNISONE 20 MG/1
40 TABLET ORAL
Status: DISCONTINUED | OUTPATIENT
Start: 2018-03-18 | End: 2018-03-18 | Stop reason: HOSPADM

## 2018-03-18 RX ORDER — PREDNISONE 10 MG/1
TABLET ORAL
Qty: 30 TABLET | Refills: 0 | Status: SHIPPED | OUTPATIENT
Start: 2018-03-18 | End: 2018-07-09 | Stop reason: ALTCHOICE

## 2018-03-18 RX ORDER — BUDESONIDE AND FORMOTEROL FUMARATE DIHYDRATE 160; 4.5 UG/1; UG/1
2 AEROSOL RESPIRATORY (INHALATION) 2 TIMES DAILY
Qty: 1 INHALER | Refills: 0 | Status: SHIPPED | OUTPATIENT
Start: 2018-03-18 | End: 2018-09-27 | Stop reason: DRUGHIGH

## 2018-03-18 RX ORDER — ALBUTEROL SULFATE 2.5 MG/3ML
2.5 SOLUTION RESPIRATORY (INHALATION) 4 TIMES DAILY
Qty: 120 EACH | Refills: 0 | Status: SHIPPED | OUTPATIENT
Start: 2018-03-18 | End: 2019-03-27

## 2018-03-18 RX ADMIN — ASPIRIN 81 MG: 81 TABLET ORAL at 08:42

## 2018-03-18 RX ADMIN — CHOLECALCIFEROL TAB 50 MCG (2000 UNIT) 4000 UNITS: 50 TAB at 08:42

## 2018-03-18 RX ADMIN — GUAIFENESIN 400 MG: 400 TABLET ORAL at 14:23

## 2018-03-18 RX ADMIN — FORMOTEROL FUMARATE DIHYDRATE 20 MCG: 20 SOLUTION RESPIRATORY (INHALATION) at 09:30

## 2018-03-18 RX ADMIN — Medication 1 CAPSULE: at 08:42

## 2018-03-18 RX ADMIN — BUDESONIDE 500 MCG: 0.5 SUSPENSION RESPIRATORY (INHALATION) at 09:30

## 2018-03-18 RX ADMIN — GUAIFENESIN 200 MG: 100 SOLUTION ORAL at 08:42

## 2018-03-18 RX ADMIN — Medication 10 ML: at 08:42

## 2018-03-18 RX ADMIN — ESTRADIOL 2 MG: 2 TABLET ORAL at 08:42

## 2018-03-18 RX ADMIN — RAMIPRIL 5 MG: 5 CAPSULE ORAL at 08:42

## 2018-03-18 RX ADMIN — PREDNISONE 40 MG: 20 TABLET ORAL at 08:42

## 2018-03-18 RX ADMIN — GUAIFENESIN 400 MG: 400 TABLET ORAL at 08:44

## 2018-03-18 RX ADMIN — IPRATROPIUM BROMIDE AND ALBUTEROL SULFATE 1 AMPULE: .5; 3 SOLUTION RESPIRATORY (INHALATION) at 12:54

## 2018-03-18 ASSESSMENT — PAIN SCALES - GENERAL
PAINLEVEL_OUTOF10: 0
PAINLEVEL_OUTOF10: 0

## 2018-03-18 NOTE — PROGRESS NOTES
Patient 87 percent on room air at rest. Applied 4 liters nasal cannula, O2 sat 94%.  Sammy Ventura RN

## 2018-03-18 NOTE — DISCHARGE SUMMARY
Physician Discharge Summary     Patient ID:  Pieter Davis  [id number]  6 y.o.  1945    Admit date: 3/15/2018    Discharge date and time:  March 18, 2018    Admission Diagnoses:   Patient Active Problem List   Diagnosis    Acute hypoxemic respiratory failure secondary to COPD with acute exacerbation, likely due to recent viral illness    Essential hypertension, benign       Discharge Diagnoses: same    Consults: pulmonary/intensive care Beuford Lennox)    Procedures: none    Hospital Course: the patient is a 71 y.o. white woman followed by DR Jeffrey Ariza who presents secondary to shortness of breath. She was found to have hypoxia. Clinical exam was consistent with COPD exacerbation. She was admitted and started on IV steroids, IV antibiotics and aerosols. Pulmonary assisted in her care. Cultures were negative for infection. Once tapered to oral steroids with stable respiratory status, she was discharged to home.     Discharge Exam:  See progress note from today    Disposition: home    Patient Instructions:   Current Discharge Medication List      START taking these medications    Details   predniSONE (DELTASONE) 10 MG tablet Take 4 tabs po qd x 3, then 3 tabs po qd x 3, then 2 tabs po qd x 3, then 1 tab po qd x 3 then STOP  Qty: 30 tablet, Refills: 0      albuterol (PROVENTIL) (2.5 MG/3ML) 0.083% nebulizer solution Take 3 mLs by nebulization 4 times daily  Qty: 120 each, Refills: 0      budesonide-formoterol (SYMBICORT) 160-4.5 MCG/ACT AERO Inhale 2 puffs into the lungs 2 times daily  Qty: 1 Inhaler, Refills: 0         CONTINUE these medications which have NOT CHANGED    Details   aspirin 81 MG EC tablet Take 81 mg by mouth every other day      Cholecalciferol (VITAMIN D3) 2000 units CAPS Take 2 capsules by mouth every morning       ramipril (ALTACE) 5 MG capsule Take 5 mg by mouth every morning       estradiol (ESTRACE) 2 MG tablet Take 2 mg by mouth every morning       albuterol sulfate  (90 Base) MCG/ACT

## 2018-03-18 NOTE — CARE COORDINATION
3/18/2018  Social Work Discharge Plannin N Seema Singh AND FAXED HER PTS HOME O2 AND NEBULIZER ORDERS. SHIVAM WILL FOLLOW.  Electronically signed by DOMINGO Nix on 3/18/2018 at 9:53 AM

## 2018-03-20 LAB
BLOOD CULTURE, ROUTINE: NORMAL
CULTURE, BLOOD 2: NORMAL

## 2018-07-09 ENCOUNTER — APPOINTMENT (OUTPATIENT)
Dept: GENERAL RADIOLOGY | Age: 73
End: 2018-07-09
Payer: MEDICARE

## 2018-07-09 ENCOUNTER — HOSPITAL ENCOUNTER (EMERGENCY)
Age: 73
Discharge: HOME | End: 2018-07-09
Attending: EMERGENCY MEDICINE
Payer: MEDICARE

## 2018-07-09 ENCOUNTER — APPOINTMENT (OUTPATIENT)
Dept: ULTRASOUND IMAGING | Age: 73
End: 2018-07-09
Payer: MEDICARE

## 2018-07-09 VITALS
WEIGHT: 134 LBS | DIASTOLIC BLOOD PRESSURE: 60 MMHG | SYSTOLIC BLOOD PRESSURE: 133 MMHG | OXYGEN SATURATION: 95 % | HEART RATE: 78 BPM | BODY MASS INDEX: 23.74 KG/M2 | RESPIRATION RATE: 18 BRPM | HEIGHT: 63 IN | TEMPERATURE: 98.3 F

## 2018-07-09 DIAGNOSIS — M79.89 RIGHT LEG SWELLING: Primary | ICD-10-CM

## 2018-07-09 PROCEDURE — 93971 EXTREMITY STUDY: CPT

## 2018-07-09 PROCEDURE — 99284 EMERGENCY DEPT VISIT MOD MDM: CPT

## 2018-07-09 PROCEDURE — 73610 X-RAY EXAM OF ANKLE: CPT

## 2018-07-09 RX ORDER — NAPROXEN 500 MG/1
500 TABLET ORAL PRN
COMMUNITY
End: 2019-04-04

## 2018-07-09 ASSESSMENT — ENCOUNTER SYMPTOMS
EYE PAIN: 0
EYE REDNESS: 0
ABDOMINAL DISTENTION: 0
DIARRHEA: 0
SINUS PRESSURE: 0
BACK PAIN: 0
COUGH: 0
SORE THROAT: 0
SHORTNESS OF BREATH: 0
EYE DISCHARGE: 0
VOMITING: 0
NAUSEA: 0
WHEEZING: 0

## 2019-03-12 ENCOUNTER — HOSPITAL ENCOUNTER (OUTPATIENT)
Dept: CT IMAGING | Age: 74
Discharge: HOME | End: 2019-03-14
Payer: MEDICARE

## 2019-03-12 DIAGNOSIS — R91.1 PULMONARY NODULE: ICD-10-CM

## 2019-03-12 PROCEDURE — 71250 CT THORAX DX C-: CPT

## 2019-03-27 ENCOUNTER — HOSPITAL ENCOUNTER (INPATIENT)
Age: 74
LOS: 1 days | Discharge: HOME | DRG: 308 | End: 2019-03-28
Attending: EMERGENCY MEDICINE | Admitting: INTERNAL MEDICINE
Payer: MEDICARE

## 2019-03-27 ENCOUNTER — APPOINTMENT (OUTPATIENT)
Dept: ULTRASOUND IMAGING | Age: 74
DRG: 308 | End: 2019-03-27
Payer: MEDICARE

## 2019-03-27 ENCOUNTER — APPOINTMENT (OUTPATIENT)
Dept: GENERAL RADIOLOGY | Age: 74
DRG: 308 | End: 2019-03-27
Payer: MEDICARE

## 2019-03-27 DIAGNOSIS — I48.91 NEW ONSET ATRIAL FIBRILLATION (HCC): Primary | ICD-10-CM

## 2019-03-27 DIAGNOSIS — R77.8 ELEVATED TROPONIN: ICD-10-CM

## 2019-03-27 DIAGNOSIS — K44.9 HIATAL HERNIA: ICD-10-CM

## 2019-03-27 DIAGNOSIS — I48.91 ATRIAL FIBRILLATION WITH RVR (HCC): ICD-10-CM

## 2019-03-27 LAB
ALBUMIN SERPL-MCNC: 3.9 G/DL (ref 3.5–5.2)
ALP BLD-CCNC: 74 U/L (ref 35–104)
ALT SERPL-CCNC: 20 U/L (ref 0–32)
ANION GAP SERPL CALCULATED.3IONS-SCNC: 16 MMOL/L (ref 7–16)
APTT: 24.7 SEC (ref 24.5–35.1)
AST SERPL-CCNC: 27 U/L (ref 0–31)
BASOPHILS ABSOLUTE: 0.09 E9/L (ref 0–0.2)
BASOPHILS RELATIVE PERCENT: 0.7 % (ref 0–2)
BILIRUB SERPL-MCNC: 0.3 MG/DL (ref 0–1.2)
BUN BLDV-MCNC: 16 MG/DL (ref 8–23)
CALCIUM SERPL-MCNC: 9.2 MG/DL (ref 8.6–10.2)
CHLORIDE BLD-SCNC: 100 MMOL/L (ref 98–107)
CO2: 21 MMOL/L (ref 22–29)
CREAT SERPL-MCNC: 1.1 MG/DL (ref 0.5–1)
EOSINOPHILS ABSOLUTE: 0.15 E9/L (ref 0.05–0.5)
EOSINOPHILS RELATIVE PERCENT: 1.2 % (ref 0–6)
GFR AFRICAN AMERICAN: 59
GFR NON-AFRICAN AMERICAN: 49 ML/MIN/1.73
GLUCOSE BLD-MCNC: 131 MG/DL (ref 74–99)
HCT VFR BLD CALC: 39.7 % (ref 34–48)
HEMOGLOBIN: 12.5 G/DL (ref 11.5–15.5)
IMMATURE GRANULOCYTES #: 0.06 E9/L
IMMATURE GRANULOCYTES %: 0.5 % (ref 0–5)
INR BLD: 1
LYMPHOCYTES ABSOLUTE: 2.06 E9/L (ref 1.5–4)
LYMPHOCYTES RELATIVE PERCENT: 16.1 % (ref 20–42)
MAGNESIUM: 1.7 MG/DL (ref 1.6–2.6)
MAGNESIUM: 1.8 MG/DL (ref 1.6–2.6)
MCH RBC QN AUTO: 28.2 PG (ref 26–35)
MCHC RBC AUTO-ENTMCNC: 31.5 % (ref 32–34.5)
MCV RBC AUTO: 89.6 FL (ref 80–99.9)
MONOCYTES ABSOLUTE: 0.75 E9/L (ref 0.1–0.95)
MONOCYTES RELATIVE PERCENT: 5.8 % (ref 2–12)
NEUTROPHILS ABSOLUTE: 9.72 E9/L (ref 1.8–7.3)
NEUTROPHILS RELATIVE PERCENT: 75.7 % (ref 43–80)
PDW BLD-RTO: 14.6 FL (ref 11.5–15)
PLATELET # BLD: 394 E9/L (ref 130–450)
PMV BLD AUTO: 9.9 FL (ref 7–12)
POTASSIUM SERPL-SCNC: 4.2 MMOL/L (ref 3.5–5)
PRO-BNP: 234 PG/ML (ref 0–125)
PROTHROMBIN TIME: 11.1 SEC (ref 9.3–12.4)
RBC # BLD: 4.43 E12/L (ref 3.5–5.5)
SODIUM BLD-SCNC: 137 MMOL/L (ref 132–146)
TOTAL PROTEIN: 7.4 G/DL (ref 6.4–8.3)
TROPONIN: 0.03 NG/ML (ref 0–0.03)
TSH SERPL DL<=0.05 MIU/L-ACNC: 8.55 UIU/ML (ref 0.27–4.2)
WBC # BLD: 12.8 E9/L (ref 4.5–11.5)

## 2019-03-27 PROCEDURE — 2500000003 HC RX 250 WO HCPCS: Performed by: EMERGENCY MEDICINE

## 2019-03-27 PROCEDURE — 6370000000 HC RX 637 (ALT 250 FOR IP): Performed by: INTERNAL MEDICINE

## 2019-03-27 PROCEDURE — 93880 EXTRACRANIAL BILAT STUDY: CPT

## 2019-03-27 PROCEDURE — 99223 1ST HOSP IP/OBS HIGH 75: CPT | Performed by: INTERNAL MEDICINE

## 2019-03-27 PROCEDURE — 85610 PROTHROMBIN TIME: CPT

## 2019-03-27 PROCEDURE — 84443 ASSAY THYROID STIM HORMONE: CPT

## 2019-03-27 PROCEDURE — 80053 COMPREHEN METABOLIC PANEL: CPT

## 2019-03-27 PROCEDURE — 99285 EMERGENCY DEPT VISIT HI MDM: CPT

## 2019-03-27 PROCEDURE — 2580000003 HC RX 258: Performed by: INTERNAL MEDICINE

## 2019-03-27 PROCEDURE — 96365 THER/PROPH/DIAG IV INF INIT: CPT

## 2019-03-27 PROCEDURE — 83880 ASSAY OF NATRIURETIC PEPTIDE: CPT

## 2019-03-27 PROCEDURE — 83735 ASSAY OF MAGNESIUM: CPT

## 2019-03-27 PROCEDURE — 2580000003 HC RX 258: Performed by: EMERGENCY MEDICINE

## 2019-03-27 PROCEDURE — 6360000002 HC RX W HCPCS: Performed by: EMERGENCY MEDICINE

## 2019-03-27 PROCEDURE — 6370000000 HC RX 637 (ALT 250 FOR IP): Performed by: EMERGENCY MEDICINE

## 2019-03-27 PROCEDURE — 96372 THER/PROPH/DIAG INJ SC/IM: CPT

## 2019-03-27 PROCEDURE — 84484 ASSAY OF TROPONIN QUANT: CPT

## 2019-03-27 PROCEDURE — 71045 X-RAY EXAM CHEST 1 VIEW: CPT

## 2019-03-27 PROCEDURE — 85730 THROMBOPLASTIN TIME PARTIAL: CPT

## 2019-03-27 PROCEDURE — 2140000000 HC CCU INTERMEDIATE R&B

## 2019-03-27 PROCEDURE — 85025 COMPLETE CBC W/AUTO DIFF WBC: CPT

## 2019-03-27 PROCEDURE — 36415 COLL VENOUS BLD VENIPUNCTURE: CPT

## 2019-03-27 RX ORDER — RAMIPRIL 2.5 MG/1
2.5 CAPSULE ORAL DAILY
Status: DISCONTINUED | OUTPATIENT
Start: 2019-03-28 | End: 2019-03-28

## 2019-03-27 RX ORDER — NAPROXEN 500 MG/1
500 TABLET ORAL 2 TIMES DAILY PRN
Status: DISCONTINUED | OUTPATIENT
Start: 2019-03-27 | End: 2019-03-28 | Stop reason: HOSPADM

## 2019-03-27 RX ORDER — ESTRADIOL 0.5 MG/1
2 TABLET ORAL EVERY MORNING
Status: DISCONTINUED | OUTPATIENT
Start: 2019-03-28 | End: 2019-03-28 | Stop reason: HOSPADM

## 2019-03-27 RX ORDER — ONDANSETRON 2 MG/ML
4 INJECTION INTRAMUSCULAR; INTRAVENOUS EVERY 6 HOURS PRN
Status: DISCONTINUED | OUTPATIENT
Start: 2019-03-27 | End: 2019-03-28 | Stop reason: HOSPADM

## 2019-03-27 RX ORDER — DILTIAZEM HYDROCHLORIDE 5 MG/ML
INJECTION INTRAVENOUS
Status: DISCONTINUED
Start: 2019-03-27 | End: 2019-03-27

## 2019-03-27 RX ORDER — ASPIRIN 325 MG
325 TABLET ORAL ONCE
Status: COMPLETED | OUTPATIENT
Start: 2019-03-27 | End: 2019-03-27

## 2019-03-27 RX ORDER — DIGOXIN 0.25 MG/ML
250 INJECTION INTRAMUSCULAR; INTRAVENOUS ONCE
Status: DISCONTINUED | OUTPATIENT
Start: 2019-03-27 | End: 2019-03-27

## 2019-03-27 RX ORDER — METOPROLOL SUCCINATE 25 MG/1
25 TABLET, EXTENDED RELEASE ORAL DAILY
Status: DISCONTINUED | OUTPATIENT
Start: 2019-03-27 | End: 2019-03-28 | Stop reason: HOSPADM

## 2019-03-27 RX ORDER — SODIUM CHLORIDE 0.9 % (FLUSH) 0.9 %
10 SYRINGE (ML) INJECTION EVERY 12 HOURS SCHEDULED
Status: DISCONTINUED | OUTPATIENT
Start: 2019-03-27 | End: 2019-03-28 | Stop reason: HOSPADM

## 2019-03-27 RX ORDER — RAMIPRIL 5 MG/1
5 CAPSULE ORAL EVERY MORNING
Status: DISCONTINUED | OUTPATIENT
Start: 2019-03-28 | End: 2019-03-28

## 2019-03-27 RX ORDER — ALBUTEROL SULFATE 90 UG/1
2 AEROSOL, METERED RESPIRATORY (INHALATION) 4 TIMES DAILY
Status: DISCONTINUED | OUTPATIENT
Start: 2019-03-27 | End: 2019-03-28 | Stop reason: HOSPADM

## 2019-03-27 RX ORDER — SODIUM CHLORIDE 0.9 % (FLUSH) 0.9 %
10 SYRINGE (ML) INJECTION PRN
Status: DISCONTINUED | OUTPATIENT
Start: 2019-03-27 | End: 2019-03-28 | Stop reason: HOSPADM

## 2019-03-27 RX ORDER — ASPIRIN 81 MG/1
81 TABLET ORAL EVERY OTHER DAY
Status: DISCONTINUED | OUTPATIENT
Start: 2019-03-27 | End: 2019-03-28

## 2019-03-27 RX ADMIN — ASPIRIN 325 MG: 325 TABLET, COATED ORAL at 13:12

## 2019-03-27 RX ADMIN — ENOXAPARIN SODIUM 60 MG: 60 INJECTION SUBCUTANEOUS at 13:50

## 2019-03-27 RX ADMIN — Medication 10 ML: at 22:30

## 2019-03-27 RX ADMIN — METOPROLOL SUCCINATE 25 MG: 25 TABLET, EXTENDED RELEASE ORAL at 16:04

## 2019-03-27 RX ADMIN — DILTIAZEM HYDROCHLORIDE 25 MG: 5 INJECTION INTRAVENOUS at 12:49

## 2019-03-27 RX ADMIN — APIXABAN 5 MG: 5 TABLET, FILM COATED ORAL at 22:30

## 2019-03-27 RX ADMIN — DILTIAZEM HYDROCHLORIDE 25 MG: 5 INJECTION INTRAVENOUS at 13:30

## 2019-03-27 RX ADMIN — TIOTROPIUM BROMIDE 18 MCG: 18 CAPSULE ORAL; RESPIRATORY (INHALATION) at 22:29

## 2019-03-27 RX ADMIN — MOMETASONE FUROATE AND FORMOTEROL FUMARATE DIHYDRATE 2 PUFF: 200; 5 AEROSOL RESPIRATORY (INHALATION) at 22:29

## 2019-03-27 ASSESSMENT — ENCOUNTER SYMPTOMS
ABDOMINAL PAIN: 0
BACK PAIN: 0
BLOOD IN STOOL: 0
CHEST TIGHTNESS: 1
COUGH: 0
SHORTNESS OF BREATH: 1
DIARRHEA: 0
VOMITING: 0
NAUSEA: 0
COLOR CHANGE: 0

## 2019-03-27 ASSESSMENT — PAIN DESCRIPTION - FREQUENCY: FREQUENCY: CONTINUOUS

## 2019-03-27 ASSESSMENT — PAIN SCALES - GENERAL
PAINLEVEL_OUTOF10: 0
PAINLEVEL_OUTOF10: 0
PAINLEVEL_OUTOF10: 5

## 2019-03-27 ASSESSMENT — PAIN DESCRIPTION - LOCATION: LOCATION: CHEST

## 2019-03-27 ASSESSMENT — PAIN DESCRIPTION - ORIENTATION: ORIENTATION: MID

## 2019-03-27 ASSESSMENT — PAIN DESCRIPTION - DESCRIPTORS: DESCRIPTORS: PRESSURE

## 2019-03-27 NOTE — CONSULTS
Inpatient Cardiology Consultation      Reason for Consult:  AF RVR    Consulting Physician: Dr Raffy Paz    Requesting Physician:  Dr Leticia Rae    Date of Consultation: 3/27/2019    HISTORY OF PRESENT ILLNESS: 69 yo female not previously knonw to any cardiologist.  PMH: HTN, sx heavy smoker quit in 2002, COPD/Emphysema, chronic back pain with sciatica,  history of R ankle (shattered secondary to fall) with chronic R ankle pain, GERD, and large hiatal hernia. Noticed around 0945 dizziness that lasted approximately 15 minutes and resolved, felt head was stuffy and chest/shoulders were achy but no palpitations. Went into store and again developed dizziness came to [facility] for evaluation 3/27/2019 found to be in AF RVR prior to receiving any Cardizem converted to SR and chest/shoulder achiness along with dizziness resolved. BP 85/50-97/55 received 2 liters NSS. Bun/Cr 16/1.1, K= 4.2, troponin 0.03, WBC 12.8, p-. Please note: past medical records were reviewed per electronic medical record (EMR) - see detailed reports under Past Medical/ Surgical History. Past Medical/SurgicalHistory:    1. Ex heavy smoker up top 2 ppd on and off for 40 years quit in 2002  2. HTN  3. cRBBB/LAFB  4. Denies HLD, CVA, hypothyroidism, DM, or CAD  5. COPD/Emphysema  6. R ankle fracture (\"shattered ankle secondary to falling on ice) chronic ankle pain s/p bilateral ankle injections  7. Back pain with sciatica s/p injection  8. Large hiatal hernia. 9. GERD  10. Varicose veins  11. Pneumonia in 2018 requiring home O2 for a period of time (later discontinued)    12. Appendectomy, partial hystrectomy  13. S/p Pessary    14. On estrogen replacement          Medications Prior to admit:  Prior to Admission medications    Medication Sig Start Date End Date Taking?  Authorizing Provider   Umeclidinium Bromide 62.5 MCG/INH AEPB Inhale 1 puff into the lungs daily 2/26/19  Yes Jann Saucedo MD   budesonide-formoterol [facility]) 80-4.5 (Last 24 hours) at 3/27/2019 1504  Last data filed at 3/27/2019 1340  Gross per 24 hour   Intake 60 ml   Output --   Net 60 ml       Labs:   CBC:   Recent Labs     03/27/19  1250   WBC 12.8*   HGB 12.5   HCT 39.7        BMP:   Recent Labs     03/27/19  1250      K 4.2   CO2 21*   BUN 16   CREATININE 1.1*   LABGLOM 49   CALCIUM 9.2     Mag:   Recent Labs     03/27/19  1250   MG 1.7     proBNP:   Recent Labs     03/27/19  1250   PROBNP 234*     PT/INR:   Recent Labs     03/27/19  1250   PROTIME 11.1   INR 1.0     APTT:  Recent Labs     03/27/19  1250   APTT 24.7     CARDIAC ENZYMES:  Recent Labs     03/27/19  1250   TROPONINI 0.03     LIVER PROFILE:  Recent Labs     03/27/19  1250   AST 27   ALT 20   LABALBU 3.9   Electronically signed by Steffi Adhikari. YOMAIRA Wells on 3/27/2019 at 3:04 PM     I personally and independently saw and examined patient and reviewed all done pertinent laboratory data, imaging studies, ECGs and rhythm strips. I have reviewed and agree with the APN history and physical exam as documented in the above note. Electronically signed by Carla Lake MD on 3/28/2019 at 7:24 AM     Consulted for AF RVR     IMPRESSION:  1. Symptomatic AF RVR (newly diagnosed) with spontaneous conversion to SR  2. cRBBB/LAFB  3. Borderline low BP. Hx HTN  4. Ex heavy smoker  5. COPD/Emphysema  6. Chronic back pain with sciatica s/p injections  7. Large hiatal hernia/GERD  8. BLE varicose veins  9. Hx R ankle fracture        PLAN:   1. Decrease Altace to 2.5 mg QD  2. Start Toprol XL 25 mg QD  3. Start Eliquis 5 mg BID  4. Check TTE  5.  Tentative discharge tomorrow 3/28/2019 IF no recurrent AF     Electronically signed by Carla Lake MD on 3/27/2019 at 3:19 PM

## 2019-03-27 NOTE — PLAN OF CARE
Problem: Falls - Risk of:  Goal: Will remain free from falls  Description  Will remain free from falls  Outcome: Met This Shift  Goal: Absence of physical injury  Description  Absence of physical injury  Outcome: Met This Shift

## 2019-03-27 NOTE — ED PROVIDER NOTES
Patient is a 71-year-old female with a history of COPD presented for acute onset lightheadedness, chest pressure in approximately one hour prior to arrival. States she was immediately grocery store when she developed dizziness, felt lightheaded and had to sit down. Symptoms did briefly resolved with sitting, return on resuming her activities. Does admit to mild chest pressure and shortness of breath. Has no history of cardiac disease or atrial fibrillation in the past. Denies nausea, vomiting, diaphoresis, abdominal pain, syncope, falls, trauma, cough, fever, chills. No recent alcohol use. His liver had a cardiac workup in the past, no echocardiogram.           Review of Systems   Constitutional: Negative for chills and fever. Respiratory: Positive for chest tightness and shortness of breath. Negative for cough. Cardiovascular: Positive for palpitations. Negative for chest pain and leg swelling. Gastrointestinal: Negative for abdominal pain, blood in stool, diarrhea, nausea and vomiting. Genitourinary: Negative for dysuria, flank pain, frequency, menstrual problem, vaginal bleeding and vaginal discharge. Musculoskeletal: Negative for back pain and neck pain. Skin: Negative for color change, rash and wound. Neurological: Negative for dizziness, syncope, weakness and light-headedness. Physical Exam   Constitutional: She is oriented to person, place, and time. She appears well-developed and well-nourished. No distress. HENT:   Head: Normocephalic and atraumatic. Mouth/Throat: Oropharynx is clear and moist.   Eyes: Pupils are equal, round, and reactive to light. EOM are normal. No scleral icterus. Neck: Normal range of motion. Neck supple. No JVD present. Cardiovascular: S1 normal, S2 normal and normal heart sounds. An irregularly irregular rhythm present. Tachycardia present. No murmur heard. Pulses:       Radial pulses are 2+ on the right side, and 2+ on the left side.         Dorsalis pedis pulses are 2+ on the right side, and 2+ on the left side. Palpable radial pulse approximately 100-110 with HR on monitor showing 180's. Pulmonary/Chest: Effort normal and breath sounds normal. No accessory muscle usage. No respiratory distress. She has no wheezes. She has no rhonchi. She has no rales. Abdominal: Soft. Normal appearance and bowel sounds are normal. She exhibits no distension. There is no tenderness. Musculoskeletal: Normal range of motion. She exhibits no edema. Lymphadenopathy:     She has no cervical adenopathy. Neurological: She is alert and oriented to person, place, and time. Skin: Skin is warm and dry. No rash noted. She is not diaphoretic. No pallor. Nursing note and vitals reviewed. Procedures    MDM    ED Course as of Mar 27 1939   Wed Mar 27, 2019   1257 On cardizem drip, BP improving, HR improving. Notes mild improvement in symptoms as well. [RU]   1310 HR improved following ER load, BP on manual cuff borderline. Reports complete symptom resolution at this time, alert and oriented with strong radial pulse. [RU]   1330 Reassssed, BP labile. Additional liter of fluids started. Attending at bedside, plan for additional ER load cardizem followed by drip. [RU]   1429 Reassessed, BP same, no complaints at this time, strong radial pulse palpable. HR ranging 105-115. [RU]   2239 Patient appears to have converted to sinus rhythm. [RU]      ED Course User Index  [RU] Jewel Piper, DO       --------------------------------------------- PAST HISTORY ---------------------------------------------  Past Medical History:  has a past medical history of Emphysema lung ([address] and Hypertension. Past Surgical History:  has a past surgical history that includes Hysterectomy and Appendectomy. Social History:  reports that she quit smoking about 17 years ago. Her smoking use included cigarettes. She started smoking about 59 years ago.  She has a 52.50 pack-year smoking history. She has never used smokeless tobacco. She reports that she does not drink alcohol or use drugs. Family History: family history includes Colon Cancer in her mother; Diabetes in her sister; Heart Disease in her father. The patients home medications have been reviewed. Allergies: Ciprofloxacin and Prevnar 13 [pneumococcal 13-delfino conj vacc]    -------------------------------------------------- RESULTS -------------------------------------------------    Lab  Results for orders placed or performed during the hospital encounter of 03/27/19   CBC Auto Differential   Result Value Ref Range    WBC 12.8 (H) 4.5 - 11.5 E9/L    RBC 4.43 3.50 - 5.50 E12/L    Hemoglobin 12.5 11.5 - 15.5 g/dL    Hematocrit 39.7 34.0 - 48.0 %    MCV 89.6 80.0 - 99.9 fL    MCH 28.2 26.0 - 35.0 pg    MCHC 31.5 (L) 32.0 - 34.5 %    RDW 14.6 11.5 - 15.0 fL    Platelets 754 507 - 489 E9/L    MPV 9.9 7.0 - 12.0 fL    Neutrophils % 75.7 43.0 - 80.0 %    Immature Granulocytes % 0.5 0.0 - 5.0 %    Lymphocytes % 16.1 (L) 20.0 - 42.0 %    Monocytes % 5.8 2.0 - 12.0 %    Eosinophils % 1.2 0.0 - 6.0 %    Basophils % 0.7 0.0 - 2.0 %    Neutrophils # 9.72 (H) 1.80 - 7.30 E9/L    Immature Granulocytes # 0.06 E9/L    Lymphocytes # 2.06 1.50 - 4.00 E9/L    Monocytes # 0.75 0.10 - 0.95 E9/L    Eosinophils # 0.15 0.05 - 0.50 E9/L    Basophils # 0.09 0.00 - 0.20 E9/L   Protime-INR   Result Value Ref Range    Protime 11.1 9.3 - 12.4 sec    INR 1.0        Radiology  XR CHEST PORTABLE    (Results Pending)       EKG: This EKG is signed and interpreted by me.     Rate: 187  Rhythm: Atrial fibrillation  Interpretation: atrial fibrillation with rvr, bifasicular block  Comparison: changes compared to previous EKG    ------------------------- NURSING NOTES AND VITALS REVIEWED ---------------------------  Date / Time Roomed:  3/27/2019 12:37 PM  ED Bed Assignment:  14B/14B-14    The nursing notes within the ED encounter and vital signs as below have been reviewed. Patient Vitals for the past 24 hrs:   BP Temp Temp src Pulse Resp SpO2 Height Weight   03/27/19 1251 89/68 -- -- 113 13 97 % -- --   03/27/19 1250 -- -- -- 125 14 97 % -- --   03/27/19 1245 (!) 97/36 -- -- 113 13 97 % -- --   03/27/19 1243 (!) 97/36 -- -- 133 19 98 % -- --   03/27/19 1238 (!) 85/50 98.2 °F (36.8 °C) Oral 111 18 94 % 5' 2\" (1.575 m) 134 lb (60.8 kg)       Oxygen Saturation Interpretation: Normal    ------------------------------------------ PROGRESS NOTES ------------------------------------------  Consultations:  Spoke with Dr. Wilber Bey,  They will admit this patient. Cardiology  --------------------------------------- ED Clinical Course/MDM --------------------------------------    Patient is a 68year old female presenting with new onset atrial fibrillation, initial rate in the 180-190 range with a palpable pulse around 100-120. Improved rate with cardizem, BP became labile, was given fluids, with frequent reassessments. Was given aspirin, lovenox. Had a subsequent conversion to sinus rhythm while here in the department. Admitted for further workup and evaluation and management. 1:16 PM  I have spoken with the patient and discussed todays results, in addition to providing specific details for the plan of care and counseling regarding the diagnosis and prognosis. Their questions are answered at this time and they are agreeable with the plan.    --------------------------------- ADDITIONAL PROVIDER NOTES ---------------------------------  This patient's ED course included: a personal history and physicial examination, re-evaluation prior to disposition, multiple bedside re-evaluations, IV medications, cardiac monitoring, continuous pulse oximetry and complex medical decision making and emergency management    This patient has improved during their ED course.     Please note that the withdrawal or failure to initiate urgent interventions for this patient would likely result in a life threatening deterioration or permanent disability. Accordingly this patient received 30 minutes of critical care time, excluding separately billable procedures. Clinical Impression  1. New onset atrial fibrillation ([address]    2. Atrial fibrillation with RVR (HCC)    3. Elevated troponin    4. Hiatal hernia          Disposition  Patient's disposition: Admit to telemetry  Patient's condition is stable.        Arlet Blanco DO  Resident  03/27/19 5164

## 2019-03-27 NOTE — ED NOTES
Talked to Isiah Simmons to make sure tube was sent to right tube system 10 mins ago as they did not have tube to send tele monitor. Floor confirmed that tube 640 is the right number.       Yaritza Hidalgo  03/27/19 4603

## 2019-03-28 VITALS
SYSTOLIC BLOOD PRESSURE: 123 MMHG | BODY MASS INDEX: 26.09 KG/M2 | HEIGHT: 62 IN | RESPIRATION RATE: 16 BRPM | HEART RATE: 71 BPM | DIASTOLIC BLOOD PRESSURE: 62 MMHG | WEIGHT: 141.8 LBS | TEMPERATURE: 97.4 F | OXYGEN SATURATION: 97 %

## 2019-03-28 LAB
ANION GAP SERPL CALCULATED.3IONS-SCNC: 9 MMOL/L (ref 7–16)
BUN BLDV-MCNC: 18 MG/DL (ref 8–23)
CALCIUM SERPL-MCNC: 8.3 MG/DL (ref 8.6–10.2)
CHLORIDE BLD-SCNC: 107 MMOL/L (ref 98–107)
CO2: 21 MMOL/L (ref 22–29)
CREAT SERPL-MCNC: 0.8 MG/DL (ref 0.5–1)
GFR AFRICAN AMERICAN: >60
GFR NON-AFRICAN AMERICAN: >60 ML/MIN/1.73
GLUCOSE BLD-MCNC: 90 MG/DL (ref 74–99)
HCT VFR BLD CALC: 33.6 % (ref 34–48)
HEMOGLOBIN: 10.5 G/DL (ref 11.5–15.5)
LV EF: 65 %
LVEF MODALITY: NORMAL
MCH RBC QN AUTO: 28.4 PG (ref 26–35)
MCHC RBC AUTO-ENTMCNC: 31.3 % (ref 32–34.5)
MCV RBC AUTO: 90.8 FL (ref 80–99.9)
PDW BLD-RTO: 14.7 FL (ref 11.5–15)
PLATELET # BLD: 304 E9/L (ref 130–450)
PMV BLD AUTO: 10.2 FL (ref 7–12)
POTASSIUM REFLEX MAGNESIUM: 4.2 MMOL/L (ref 3.5–5)
POTASSIUM SERPL-SCNC: 4.2 MMOL/L (ref 3.5–5)
RBC # BLD: 3.7 E12/L (ref 3.5–5.5)
REASON FOR REJECTION: NORMAL
REJECTED TEST: NORMAL
SODIUM BLD-SCNC: 137 MMOL/L (ref 132–146)
T4 FREE: 1.16 NG/DL (ref 0.93–1.7)
WBC # BLD: 7.5 E9/L (ref 4.5–11.5)

## 2019-03-28 PROCEDURE — 84439 ASSAY OF FREE THYROXINE: CPT

## 2019-03-28 PROCEDURE — 2580000003 HC RX 258: Performed by: INTERNAL MEDICINE

## 2019-03-28 PROCEDURE — 97161 PT EVAL LOW COMPLEX 20 MIN: CPT

## 2019-03-28 PROCEDURE — 93306 TTE W/DOPPLER COMPLETE: CPT

## 2019-03-28 PROCEDURE — 97535 SELF CARE MNGMENT TRAINING: CPT

## 2019-03-28 PROCEDURE — 97165 OT EVAL LOW COMPLEX 30 MIN: CPT

## 2019-03-28 PROCEDURE — 80048 BASIC METABOLIC PNL TOTAL CA: CPT

## 2019-03-28 PROCEDURE — 6370000000 HC RX 637 (ALT 250 FOR IP): Performed by: INTERNAL MEDICINE

## 2019-03-28 PROCEDURE — 99233 SBSQ HOSP IP/OBS HIGH 50: CPT | Performed by: INTERNAL MEDICINE

## 2019-03-28 PROCEDURE — 36415 COLL VENOUS BLD VENIPUNCTURE: CPT

## 2019-03-28 PROCEDURE — 85027 COMPLETE CBC AUTOMATED: CPT

## 2019-03-28 RX ORDER — METOPROLOL SUCCINATE 25 MG/1
25 TABLET, EXTENDED RELEASE ORAL DAILY
Qty: 30 TABLET | Refills: 3 | Status: SHIPPED | OUTPATIENT
Start: 2019-03-29 | End: 2019-07-25 | Stop reason: SDUPTHER

## 2019-03-28 RX ORDER — RAMIPRIL 5 MG/1
5 CAPSULE ORAL DAILY
Status: DISCONTINUED | OUTPATIENT
Start: 2019-03-29 | End: 2019-03-28 | Stop reason: HOSPADM

## 2019-03-28 RX ADMIN — Medication 10 ML: at 09:00

## 2019-03-28 RX ADMIN — RAMIPRIL 5 MG: 5 CAPSULE ORAL at 08:34

## 2019-03-28 RX ADMIN — METOPROLOL SUCCINATE 25 MG: 25 TABLET, EXTENDED RELEASE ORAL at 08:33

## 2019-03-28 RX ADMIN — ESTRADIOL 2 MG: 0.5 TABLET ORAL at 08:35

## 2019-03-28 RX ADMIN — ALBUTEROL SULFATE 2 PUFF: 90 AEROSOL, METERED RESPIRATORY (INHALATION) at 13:11

## 2019-03-28 RX ADMIN — TIOTROPIUM BROMIDE 18 MCG: 18 CAPSULE ORAL; RESPIRATORY (INHALATION) at 08:31

## 2019-03-28 RX ADMIN — APIXABAN 5 MG: 5 TABLET, FILM COATED ORAL at 08:33

## 2019-03-28 RX ADMIN — ALBUTEROL SULFATE 2 PUFF: 90 AEROSOL, METERED RESPIRATORY (INHALATION) at 08:30

## 2019-03-28 RX ADMIN — MOMETASONE FUROATE AND FORMOTEROL FUMARATE DIHYDRATE 2 PUFF: 200; 5 AEROSOL RESPIRATORY (INHALATION) at 08:36

## 2019-03-28 ASSESSMENT — PAIN SCALES - GENERAL
PAINLEVEL_OUTOF10: 0
PAINLEVEL_OUTOF10: 0

## 2019-03-28 NOTE — PROGRESS NOTES
Inpatient Cardiology Progress note     PATIENT IS BEING FOLLOWED FOR: ANTHONY Mancilla is a 68 y.o. female not previously known to [facility] cardiology, seen in initial consultation by Dr. Nic Flower 3/27/19      SUBJECTIVE: Denies CP, SOB, palpitations or light headedness  OBJECTIVE: No apparent distress     ROS:  Consist: Denies fevers, chills or night sweats  Heart: Denies chest pain, palpitations, lightheadedness, dizziness or syncope  Lungs: Denies SOB, cough, wheezing, orthopnea or PND  GI: Denies abdominal pain, vomiting or diarrhea    PHYSICAL EXAM:   BP (!) 133/59   Pulse 76   Temp 97.7 °F (36.5 °C) (Temporal)   Resp 16   Ht 5' 2\" (1.575 m)   Wt 141 lb 12.8 oz (64.3 kg)   SpO2 96%   BMI 25.94 kg/m²    B/P Range last 24 hours: Systolic (40HDK), QJV:65 , Min:80 , ATS:864    Diastolic (75LLL), CTN:71, Min:36, Max:77    CONST: Well developed, well nourished  female who appears of stated age. Awake, alert and cooperative. No apparent distress  HEENT:   Head- Normocephalic, atraumatic   Eyes- Conjunctivae pink, anicteric  Throat- Oral mucosa pink and moist  Neck-  No stridor, trachea midline, no jugular venous distention. + carotid bruit  CHEST: Chest symmetrical and non-tender to palpation. No accessory muscle use or intercostal retractions  RESPIRATORY:  Lung sounds - clear throughout fields   CARDIOVASCULAR:     Heart Inspection- shows no noted pulsations  Heart Palpation- no heaves or thrills; PMI is non-displaced   Heart Ausculation- Regular rate and rhythm. II/Vi SM. No s3, s4 or rub   PV: No lower extremity edema. No varicosities. Pedal pulses palpable, no clubbing or cyanosis   ABDOMEN: Soft, non-tender to light palpation. Bowel sounds present. No palpable masses no organomegaly; no abdominal bruit  MS: Good muscle strength and tone. No atrophy or abnormal movements. : Deferred  SKIN: Warm and dry no statis dermatitis or ulcers   NEURO / PSYCH: Oriented to person, place and time.  Speech flow.    Echo: pending    Telemetry: SR      ASSESSMENT:   1. Symptomatic AF RVR ( diagnosed 3/27/19 ) with spontaneous conversion to SR. Remains in SR  2. cRBBB/LAFB  3. Borderline low BP, resolved. Hx HTN  4. Ex heavy smoker  5. COPD/Emphysema  6. Chronic back pain with sciatica s/p injections  7. Large hiatal hernia/GERD  8. BLE varicose veins  9. Hx R ankle fracture  10. Mild Anemia  11. Elevated TSH         PLAN:  1. Discontinue aspirin  2. Increase Altace to 5 mg daily  3. Monitor HR ( and BP ). Adjust Toprol XL dose as needed  4. Continue Eliquis  5. Check free T4  6.  Tentative discharge later today from cardiology stand point pending the results of the Echo ( being done )    Electronically signed by Audrey Maynard MD on 3/28/2019 at 11:52 AM

## 2019-03-28 NOTE — PROGRESS NOTES
Spoke with Dr. Severino Kendrick while on unit' states pt can be discharged from cardiology stand point.

## 2019-03-28 NOTE — PROGRESS NOTES
Physical Therapy  Initial Assessment     Name: Manuel Alvarez  : 1945  MRN: [id number]    Date of Service: 3/28/2019    Evaluating PT:  Godwin Gavin, PT, DPT, [id number]    Room #:  3047/3449-L  Diagnosis:  New onset a fib  Precautions:  Falls  Equipment Needs:  none    Pt lives with  in a 1 story home with 4 stair(s) to enter and 2 rail(s). Bed is on 1st floor and bath is on 1st floor. Pt ambulated with no AD PTA. Pt independent for ADL performance. Initial Evaluation  Date:    AM-PAC 6 Clicks 50/03   Was pt agreeable to Eval/treatment? Yes    Does pt have pain? Denies    Bed Mobility  Rolling: NT  Supine to sit: Independent  Sit to supine: NT  Scooting: Independent   Transfers Sit to stand: independent  Stand to sit: independent  Stand pivot: Independent   Ambulation    150 feet with no AD independent   Stair negotiation: ascended and descended  NT   ROM BUE:  See OT eval  BLE:  WNL   Strength BUE:  See OT eval  BLE grossly:  5/5   Balance Sitting EOB:  independent  Dynamic Standing:  Independent     Pt is A & O x 3    Patient education  Pt educated on safety and role of PT     Patient response to education:   Pt verbalized understanding Pt demonstrated skill Pt requires further education in this area   yes yes no     Assessment/Comments    Pt supine in bed upon entry to room. Pt independent with all functional transfers and mobility. Ambulated 150 feet with no AD. Gait pattern is modified with extended stance time and limp during weight bearing on RLE -- this is from R ankle fx 25 years ago. If pt has difficulty, can follow up with outpatient PT. Returned to room with all needs met. Pt at independent baseline with no skilled acute PT needs at this time. DC from PT services. Pts/ family goals   1. Return home    Patient and or family understand(s) diagnosis, prognosis, and plan of care. Yes     PLAN  Pt at independent baseline. No skilled PT needs.  DC from PT services     Time in 6073  Time out  [address] [place] [address], PT, DPT  [id number]

## 2019-03-28 NOTE — PROGRESS NOTES
Fair  Good   Visual/  Perceptual Glasses: Yes  WFL                  Hand dominance: Right     Strength ROM Additional Info:    RUE  5/5  WFL good  and wfl FMC/dexterity noted during ADL tasks       LUE 5/5  WFL good  and wfl FMC/dexterity noted during ADL tasks         Hearing: WFL  Sensation:  No c/o numbness or tingling  Tone: WFL   Edema: None noted. Comments/Treatment: Upon arrival, patient supine in bed and agreeable to OT session with PT collaboration. Therapist educated pt on role of OT. Therapist facilitated bed mobility (supine to sit), functional transfers from multiple surfaces (sit<>stand, stand-pivot; bed, chair toielt), graded functional activities (static/dynamic sitting, static/dynamic standing, functional reaching, weight shifting), functional ambulation without ADF (in hallway, room/bathroom) - providing min cuing (verbal, visual, tactile) sequencing, body mechanics, posture, breathing techniques, and safety. Therapist facilitated self-care retraining tasks: UB dressing (gown around back), LB dressing (socks), toileting (in bathroom, clothing management, hygiene), grooming task (standing at sink; bilateral) - providing min cuing (verbal, visual, tactile) on sequencing, body mechanics, posture, compensatory strategies, breathing techniques, and safety. Skilled monitoring of O2 sats, HR, and pt response throughout treatment. Pt demonstrated fair understanding of techniques / education, requiring additional education / training. At end of session, pt was sitting in chair with call and phone in reach. Pt would benefit from continued skilled occupational therapy services.       Eval Complexity: Low    (Evaluation time includes thorough review of current medical information, gathering information on past medical history/social history and prior level of function, completion of standardized testing/informal observation of tasks, assessment of data, and development of POC/Goals.)      Assessment of current deficits   Functional mobility ? ADLs ? Strength ? Cognition ? Functional transfers  ? IADLs ? Safety Awareness ? Endurance ? Fine Motor Coordination ? Balance ? Vision/perception ? Sensation ? Gross Motor Coordination ? ROM ? Delirium ? Motor Control ? Plan of Care:   ADL retraining ? Equipment needs ? Neuromuscular re-education ? Energy Conservation Techniques ? Functional Transfer training ? Patient and/or Family Education ? Functional Mobility training ? Environmental Modifications ? Cognitive re-training ? Compensatory techniques for ADLs ? Splinting Needs ? Positioning to improve overall function ? Therapeutic Activity ? Therapeutic Exercise  ? Visual/Perceptual: ?    Delirium prevention/treatment  ? Other:  ?    Rehab Potential: Good for established goals     Patient / Family Goal: To return home. Patient and/or family were instructed diagnosis, prognosis/goals and plan of care. Demonstrated fair understanding. ? Malnutrition indicators have been identified and nursing has been notified to ensure a dietitian consult is ordered.        AM-PAC Daily Activity Inpatient   How much help for putting on and taking off regular lower body clothing?: A Little  How much help for Bathing?: A Little  How much help for Toileting?: A Little  How much help for putting on and taking off regular upper body clothing?: A Little  How much help for taking care of personal grooming?: A Little  How much help for eating meals?: None  AM-PAC Inpatient Daily Activity Raw Score: 19  AM-PAC Inpatient ADL T-Scale Score : 40.22  ADL Inpatient CMS 0-100% Score: 42.8  ADL Inpatient CMS G-Code Modifier : CK        Low Evaluation + 10 timed treatment minutes  Tx Time in: 0808  Tx Time out: 30 [facility] S/OT  Rodolfo Rees OTR/L #[id number]

## 2019-03-28 NOTE — PLAN OF CARE
Problem: Falls - Risk of:  Goal: Will remain free from falls  Description  Will remain free from falls  3/27/2019 2247 by Cate Calderon RN  Outcome: Met This Shift  3/27/2019 1802 by Cate Calderon RN  Outcome: Met This Shift  Goal: Absence of physical injury  Description  Absence of physical injury  3/27/2019 2247 by Cate Calderon RN  Outcome: Met This Shift  3/27/2019 1802 by Cate Calderon RN  Outcome: Met This Shift

## 2019-03-28 NOTE — H&P
quit smoking about 17 years ago. Her smoking use included cigarettes. She started smoking about 59 years ago. She has a 52.50 pack-year smoking history. She has never used smokeless tobacco.  ETOH:   reports that she does not drink alcohol. MARITAL STATUS:    OCCUPATION:      Family History:       Problem Relation Age of Onset    Colon Cancer Mother     Heart Disease Father     Diabetes Sister        REVIEW OF SYSTEMS:    General ROS: lightheaded   Hematological and Lymphatic ROS: negative  Endocrine ROS: negative  Respiratory ROS: no cough, shortness of breath, or wheezing  Cardiovascular ROS: no chest pain or dyspnea on exertion  Gastrointestinal ROS: no abdominal pain, change in bowel habits, or black or bloody stools  Genito-Urinary ROS: no dysuria, trouble voiding, or hematuria  Neurological ROS: no TIA or stroke symptoms  negative    Vitals:  BP (!) 133/59   Pulse 76   Temp 97.7 °F (36.5 °C) (Temporal)   Resp 16   Ht 5' 2\" (1.575 m)   Wt 141 lb 12.8 oz (64.3 kg)   SpO2 96%   BMI 25.94 kg/m²     PHYSICAL EXAM:  General:  Awake, alert, oriented X 3. Well developed, well nourished, well groomed. No apparent distress. HEENT:  Normocephalic, atraumatic. Pupils equal, round, reactive to light. No scleral icterus. No conjunctival injection. Normal lips, teeth, and gums. No nasal discharge. Neck:  Supple  Heart:  RRR, no murmurs, gallops, rubs, carotid upstroke normal, no carotid bruits  Lungs:  CTA bilaterally, bilat symmetrical expansion, no wheeze, rales, or rhonchi  Abdomen:   Bowel sounds present, soft, nontender, no masses, no organomegaly, no peritoneal signs  Extremities:  No clubbing, cyanosis, or edema  Skin:  Warm and dry, no open lesions or rash  Neuro:  Cranial nerves 2-12 intact, no focal deficits  Breast: deferred  Rectal: deferred  Genitalia:  deferred      DATA:     Recent Labs     03/27/19  1250 03/28/19  0759   WBC 12.8* 7.5   HGB 12.5 10.5*    304     Recent Labs 03/27/19  1250 03/28/19  0537    137   K 4.2 4.2  4.2   BUN 16 18   CREATININE 1.1* 0.8     Recent Labs     03/27/19  1250   PROT 7.4   INR 1.0     Recent Labs     03/27/19  1250   AST 27   ALT 20   ALKPHOS 74   BILITOT 0.3     No results for input(s): BNP in the last 72 hours. Recent Labs     03/27/19  1250   TROPONINI 0.03       ASSESSMENT:      Principal Problem:    New onset atrial fibrillation (HCC)  Active Problems:    Acute hypoxemic respiratory failure (HCC)    COPD (chronic obstructive pulmonary disease) (HCC)    Essential hypertension    Pulmonary nodule seen on imaging study  Resolved Problems:    * No resolved hospital problems.  *        PLAN:    Admitted to tle for eval of new onset atrial fib with rvr   Rate control with bb,   oac with eliquis   Await echo     Jeb Prom for dc thereafter today       Suresh Bright MD  3/28/2019  10:40 AM

## 2019-03-29 ENCOUNTER — TELEPHONE (OUTPATIENT)
Dept: CARDIOLOGY CLINIC | Age: 74
End: 2019-03-29

## 2019-03-29 NOTE — TELEPHONE ENCOUNTER
Yamilka Chappell called. Just released from hospital yesterday (3/28/19) and PCP told her to call and see if and when Dr. Bharath Mcgovern wants to see. Advised he will look at hospital records and Pari Pineda will call back on Monday.

## 2019-03-31 LAB
EKG ATRIAL RATE: 57 BPM
EKG ATRIAL RATE: 67 BPM
EKG P AXIS: 29 DEGREES
EKG P-R INTERVAL: 132 MS
EKG Q-T INTERVAL: 260 MS
EKG Q-T INTERVAL: 406 MS
EKG QRS DURATION: 120 MS
EKG QRS DURATION: 130 MS
EKG QTC CALCULATION (BAZETT): 429 MS
EKG QTC CALCULATION (BAZETT): 458 MS
EKG R AXIS: -56 DEGREES
EKG R AXIS: -84 DEGREES
EKG T AXIS: 56 DEGREES
EKG T AXIS: 57 DEGREES
EKG VENTRICULAR RATE: 187 BPM
EKG VENTRICULAR RATE: 67 BPM

## 2019-04-02 ENCOUNTER — TELEPHONE (OUTPATIENT)
Dept: CARDIOLOGY CLINIC | Age: 74
End: 2019-04-02

## 2019-04-02 NOTE — TELEPHONE ENCOUNTER
4/2/19 @ 4:00 PM  ATTEMPTED TO CALL THE PT. AT [address] [facility] FOLLOW UP VISIT. NO ANSWER, NO VOICE MAIL. CALLED AND SPOKE WITH ED ERNANDEZ TOLD HER TO HAVE HER MOTHER CALL TOMORROW AND WE WILL SCHEDULE HER AN APPT.  ANTOLIN

## 2019-04-04 ENCOUNTER — OFFICE VISIT (OUTPATIENT)
Dept: CARDIOLOGY CLINIC | Age: 74
End: 2019-04-04
Payer: MEDICARE

## 2019-04-04 VITALS
WEIGHT: 139 LBS | HEART RATE: 61 BPM | SYSTOLIC BLOOD PRESSURE: 112 MMHG | BODY MASS INDEX: 25.58 KG/M2 | RESPIRATION RATE: 12 BRPM | HEIGHT: 62 IN | DIASTOLIC BLOOD PRESSURE: 78 MMHG

## 2019-04-04 DIAGNOSIS — I48.91 NEW ONSET ATRIAL FIBRILLATION (HCC): ICD-10-CM

## 2019-04-04 DIAGNOSIS — I38 VHD (VALVULAR HEART DISEASE): ICD-10-CM

## 2019-04-04 DIAGNOSIS — I10 HYPERTENSION, UNSPECIFIED TYPE: Primary | ICD-10-CM

## 2019-04-04 PROCEDURE — G8419 CALC BMI OUT NRM PARAM NOF/U: HCPCS | Performed by: NURSE PRACTITIONER

## 2019-04-04 PROCEDURE — 99213 OFFICE O/P EST LOW 20 MIN: CPT | Performed by: NURSE PRACTITIONER

## 2019-04-04 PROCEDURE — 1111F DSCHRG MED/CURRENT MED MERGE: CPT | Performed by: NURSE PRACTITIONER

## 2019-04-04 PROCEDURE — G8427 DOCREV CUR MEDS BY ELIG CLIN: HCPCS | Performed by: NURSE PRACTITIONER

## 2019-04-04 PROCEDURE — 1036F TOBACCO NON-USER: CPT | Performed by: NURSE PRACTITIONER

## 2019-04-04 PROCEDURE — 3017F COLORECTAL CA SCREEN DOC REV: CPT | Performed by: NURSE PRACTITIONER

## 2019-04-04 PROCEDURE — 1090F PRES/ABSN URINE INCON ASSESS: CPT | Performed by: NURSE PRACTITIONER

## 2019-04-04 PROCEDURE — G8400 PT W/DXA NO RESULTS DOC: HCPCS | Performed by: NURSE PRACTITIONER

## 2019-04-04 PROCEDURE — 93000 ELECTROCARDIOGRAM COMPLETE: CPT | Performed by: NURSE PRACTITIONER

## 2019-04-04 PROCEDURE — 1123F ACP DISCUSS/DSCN MKR DOCD: CPT | Performed by: NURSE PRACTITIONER

## 2019-04-04 PROCEDURE — 4040F PNEUMOC VAC/ADMIN/RCVD: CPT | Performed by: NURSE PRACTITIONER

## 2019-04-04 RX ORDER — MELOXICAM 7.5 MG/1
TABLET ORAL
Refills: 0 | COMMUNITY
Start: 2019-03-14 | End: 2019-04-04

## 2019-04-04 NOTE — PROGRESS NOTES
weight changes   Musculoskeletal: no arthralgias or myalgias   Skin: denies rashes or itching or lesions but admits to easy bruising   Heme/Lymph: denies bleeding   Heart: as above   Lungs: as above   GI: denies abdominal pain, nausea, vomiting, diarrhea, rectal bleeding, tarry stools   : denies hematuria, dysuria   Psych: denies mood change, anxiety, depression. Neuro: denies numbness, tingling, tremors. CARDIOVASCULAR HISTORY:         1. Ex heavy smoker up top 2 ppd on and off for 40 years quit in 2002  2. HTN  3. cRBBB/LAFB  4. Denies HLD, CVA, hypothyroidism, DM, or CAD  5. Paroxysmal atrial fibrillation, symptomatic, March 27, 2019, on [address]  6. 2-D echo March 27, 2019  Normal left ventricular size, wall thickness, wall motion, and systolic   function.   Ejection fraction is visually estimated at 65%.   There is doppler evidence of stage II diastolic dysfunction.   Mildly dilated right ventricle.   Right ventricle global systolic function is normal .   Focal calcification mitral valve leaflets.   Mild mitral annular calcification.   Mild mitral stenosis.   Mild mitral regurgitation is present.   The aortic valve appears mildly sclerotic.   No hemodynamically significant aortic stenosis is present.   Mild aortic regurgitation is noted.   Aortic root is sclerotic and calcified.   ild to moderate tricuspid regurgitation.   Moderate pulmonary hypertension.     7. PAST MEDICAL HISTORY    8. COPD/Emphysema  9. R ankle fracture (\"shattered ankle secondary to falling on ice) chronic ankle pain s/p bilateral ankle injections  10. Back pain with sciatica s/p injection  11. Large hiatal hernia. 12. GERD  13. Varicose veins  14. Pneumonia in 2018 requiring home O2 for a period of time (later discontinued)    15. Appendectomy, partial hystrectomy  16. S/p Pessary    17.  On estrogen replacement         FAMILY HISTORY-noncontributory    SOCIAL HISTORY  Ex heavy smoker up top 2 ppd on and off for 40 years quit in 4581  Denies ETOH/Illic[facility] Drugs        O:  COMPLETE PHYSICAL EXAM:       /78   Pulse 61   Resp 12   Ht 5' 2\" (1.575 m)   Wt 139 lb (63 kg)   BMI 25.42 kg/m²       General:   in no acute distress. Well-nourished well-developed   Skin                  Warm and dry, no rashes   Head & Neck:  No JVD. No carotid bruits. Mucous membranes moist.   Chest:   No deformities. Symmetrical and nontender. No respiratory distress   Lungs:   Clear to auscultation bilaterally. Heart:  Normal S1 and S2. No S3 or S4. No Murmur. No gallops no rubs   Abdomen: Soft without organomegaly or masses. Nontender, Bowel sound     normoactive   Extremities:  No edema of lower extremities . No cyanosis and moves all extremities   Neuro:  Alert & orient x 3 no focal deficits     REVIEW OF DIAGNOSTIC TESTS:       Lab Results   Component Value Date     03/28/2019     03/27/2019     03/15/2018    K 4.2 03/28/2019    K 4.2 03/28/2019    K 4.2 03/27/2019    K 3.8 03/15/2018     03/28/2019     03/27/2019    CL 97 03/15/2018    CO2 21 03/28/2019    CO2 21 03/27/2019    CO2 28 03/15/2018    BUN 18 03/28/2019    BUN 16 03/27/2019    BUN 8 03/15/2018    CREATININE 0.8 03/28/2019    CREATININE 1.1 03/27/2019    CREATININE 0.7 03/15/2018         Lab Results   Component Value Date    PROBNP 234 (H) 03/27/2019    PROBNP 135 (H) 03/15/2018    EKG today sinus rhythm with right bundle branch block and left axis and old anteroseptal infarct     ASSESSMENT / DIAGNOSIS:   1. Symptomatic AF RVR ( diagnosed 3/27/19 ) with spontaneous conversion to SR. Remains in SR  2. cRBBB/LAFB  3. Borderline low BP, resolved. Hx HTN  4. Ex heavy smoker  5. COPD/Emphysema  6. Chronic back pain with sciatica s/p injections  7. Large hiatal hernia/GERD  8. BLE varicose veins  9. Hx R ankle fracture  10. Mild Anemia  11. Elevated TSH with normal free T4  12.  Mild-to-moderate tricuspid regurgitation and mild aortic regurgitation and mild mitral stenosis and mild mitral regurgitation, moderate pulmonary hypertension by 2-D echocardiogram in September 2019                                 TREATMENT PLAN:  1. Continue current medications  2. We discussed avoiding nonsteroidals  3. Return to the office in 6 months or sooner if needed      The patient's current medication list, allergies, problem list and results of all previously ordered tests were reviewed at today's visit      Steven Ku, APRN - CNP        [address]  [facility] [address]  [phone]

## 2019-04-04 NOTE — PATIENT INSTRUCTIONS
Patient Education        A Healthy Heart: Care Instructions  Your Care Instructions    Heart disease occurs when a substance called plaque builds up in the vessels that supply oxygen-rich blood to your heart. This can narrow the blood vessels and reduce blood flow. A heart attack happens when blood flow is completely blocked. A high-fat diet, smoking, and other factors increase the risk of heart disease. Your doctor has found that you have a chance of having heart disease. You can do lots of things to keep your heart healthy. It may not be easy, but you can change your diet, exercise more, and quit smoking. These steps really work to lower your chance of heart disease. Follow-up care is a key part of your treatment and safety. Be sure to make and go to all appointments, and call your doctor if you are having problems. It's also a good idea to know your test results and keep a list of the medicines you take. How can you care for yourself at home? Diet    · Use less salt when you cook and eat. This helps lower your blood pressure. Taste food before salting. Add only a little salt when you think you need it. With time, your taste buds will adjust to less salt.     · Eat fewer snack items, fast foods, canned soups, and other high-salt, high-fat, processed foods.     · Read food labels and try to avoid saturated and trans fats. They increase your risk of heart disease by raising cholesterol levels.     · Limit the amount of solid fat-butter, margarine, and shortening-you eat. Use olive, peanut, or canola oil when you cook. Bake, broil, and steam foods instead of frying them.     · Eating fish can lower your risk for heart disease. Eat at least 2 servings of fish a week. [place], mackerel, herring, sardines, and chunk light tuna are very good choices. These fish contain omega-3 fatty acids.     · Eat a variety of fruit and vegetables every day.  Dark green, deep orange, red, or yellow fruits and vegetables are especially good for you. Examples include spinach, carrots, peaches, and berries.     · Foods high in fiber can reduce your cholesterol and provide important vitamins and minerals. High-fiber foods include whole-grain cereals and breads, oatmeal, beans, brown rice, citrus fruits, and apples.     · Limit drinks and foods with added sugar. These include candy, desserts, and soda pop.    Lifestyle changes    · If your doctor recommends it, get more exercise. Walking is a good choice. Bit by bit, increase the amount you walk every day. Try for at least 30 minutes on most days of the week. You also may want to swim, bike, or do other activities.     · Do not smoke. If you need help quitting, talk to your doctor about stop-smoking programs and medicines. These can increase your chances of quitting for good. Quitting smoking may be the most important step you can take to protect your heart. It is never too late to quit. You will get health benefits right away.     · Limit alcohol to 2 drinks a day for men and 1 drink a day for women. Too much alcohol can cause health problems. Medicines    · Take your medicines exactly as prescribed. Call your doctor if you think you are having a problem with your medicine.     · If your doctor recommends aspirin, take the amount directed each day. Make sure you take aspirin and not another kind of pain reliever, such as acetaminophen (Tylenol). If you take ibuprofen (such as Advil or Motrin) for other problems, take aspirin at least 2 hours before taking ibuprofen. When should you call for help? Call 911 if you have symptoms of a heart attack.  These may include:    · Chest pain or pressure, or a strange feeling in the chest.     · Sweating.     · Shortness of breath.     · Pain, pressure, or a strange feeling in the back, neck, jaw, or upper belly or in one or both shoulders or arms.     · Lightheadedness or sudden weakness.     · A fast or irregular heartbeat.    After you call 911, the  may tell you to chew 1 adult-strength or 2 to 4 low-dose aspirin. Wait for an ambulance. Do not try to drive yourself.   Watch closely for changes in your health, and be sure to contact your doctor if you have any problems. Where can you learn more? Go to [email or web address] and sign in to your [employer] account. Enter V771 in the [employer] box to learn more about \"A Healthy Heart: Care Instructions. \"     If you do not have an account, please click on the \"Sign Up Now\" link. Current as of: July 22, 2018  Content Version: 11.9  © 3327-8285 [employer]. Care instructions adapted under license by [facility] ([facility]). If you have questions about a medical condition or this instruction, always ask your healthcare professional. [address] any warranty or liability for your use of this information.

## 2019-07-25 RX ORDER — METOPROLOL SUCCINATE 25 MG/1
25 TABLET, EXTENDED RELEASE ORAL DAILY
Qty: 30 TABLET | Refills: 6 | Status: SHIPPED
Start: 2019-07-25 | End: 2020-02-19

## 2019-10-14 ENCOUNTER — OFFICE VISIT (OUTPATIENT)
Dept: CARDIOLOGY CLINIC | Age: 74
End: 2019-10-14
Payer: MEDICARE

## 2019-10-14 VITALS
WEIGHT: 145.8 LBS | SYSTOLIC BLOOD PRESSURE: 118 MMHG | OXYGEN SATURATION: 95 % | DIASTOLIC BLOOD PRESSURE: 60 MMHG | HEIGHT: 62 IN | RESPIRATION RATE: 16 BRPM | BODY MASS INDEX: 26.83 KG/M2 | HEART RATE: 70 BPM

## 2019-10-14 DIAGNOSIS — I27.20 PULMONARY HTN (HCC): ICD-10-CM

## 2019-10-14 DIAGNOSIS — I83.93 ASYMPTOMATIC VARICOSE VEINS OF BOTH LOWER EXTREMITIES: ICD-10-CM

## 2019-10-14 DIAGNOSIS — K44.9 HIATAL HERNIA WITH GASTROESOPHAGEAL REFLUX: ICD-10-CM

## 2019-10-14 DIAGNOSIS — Z87.01 H/O: PNEUMONIA: ICD-10-CM

## 2019-10-14 DIAGNOSIS — R91.8 MULTIPLE PULMONARY NODULES: ICD-10-CM

## 2019-10-14 DIAGNOSIS — I44.4 LAFB (LEFT ANTERIOR FASCICULAR BLOCK): ICD-10-CM

## 2019-10-14 DIAGNOSIS — J43.9 PULMONARY EMPHYSEMA, UNSPECIFIED EMPHYSEMA TYPE (HCC): ICD-10-CM

## 2019-10-14 DIAGNOSIS — Z79.01 ANTICOAGULATED BY ANTICOAGULATION TREATMENT: ICD-10-CM

## 2019-10-14 DIAGNOSIS — Z87.891 EX-SMOKER: ICD-10-CM

## 2019-10-14 DIAGNOSIS — I38 VHD (VALVULAR HEART DISEASE): ICD-10-CM

## 2019-10-14 DIAGNOSIS — M54.40 CHRONIC LOW BACK PAIN WITH SCIATICA, SCIATICA LATERALITY UNSPECIFIED, UNSPECIFIED BACK PAIN LATERALITY: ICD-10-CM

## 2019-10-14 DIAGNOSIS — I45.10 RBBB (RIGHT BUNDLE BRANCH BLOCK): ICD-10-CM

## 2019-10-14 DIAGNOSIS — I48.0 PAF (PAROXYSMAL ATRIAL FIBRILLATION) (HCC): Primary | ICD-10-CM

## 2019-10-14 DIAGNOSIS — G89.29 CHRONIC LOW BACK PAIN WITH SCIATICA, SCIATICA LATERALITY UNSPECIFIED, UNSPECIFIED BACK PAIN LATERALITY: ICD-10-CM

## 2019-10-14 DIAGNOSIS — K21.9 HIATAL HERNIA WITH GASTROESOPHAGEAL REFLUX: ICD-10-CM

## 2019-10-14 PROCEDURE — G8427 DOCREV CUR MEDS BY ELIG CLIN: HCPCS | Performed by: INTERNAL MEDICINE

## 2019-10-14 PROCEDURE — G8484 FLU IMMUNIZE NO ADMIN: HCPCS | Performed by: INTERNAL MEDICINE

## 2019-10-14 PROCEDURE — G8400 PT W/DXA NO RESULTS DOC: HCPCS | Performed by: INTERNAL MEDICINE

## 2019-10-14 PROCEDURE — 99213 OFFICE O/P EST LOW 20 MIN: CPT | Performed by: INTERNAL MEDICINE

## 2019-10-14 PROCEDURE — 3023F SPIROM DOC REV: CPT | Performed by: INTERNAL MEDICINE

## 2019-10-14 PROCEDURE — G8419 CALC BMI OUT NRM PARAM NOF/U: HCPCS | Performed by: INTERNAL MEDICINE

## 2019-10-14 PROCEDURE — G8926 SPIRO NO PERF OR DOC: HCPCS | Performed by: INTERNAL MEDICINE

## 2019-10-14 PROCEDURE — 1123F ACP DISCUSS/DSCN MKR DOCD: CPT | Performed by: INTERNAL MEDICINE

## 2019-10-14 PROCEDURE — 1090F PRES/ABSN URINE INCON ASSESS: CPT | Performed by: INTERNAL MEDICINE

## 2019-10-14 PROCEDURE — 1036F TOBACCO NON-USER: CPT | Performed by: INTERNAL MEDICINE

## 2019-10-14 PROCEDURE — 3017F COLORECTAL CA SCREEN DOC REV: CPT | Performed by: INTERNAL MEDICINE

## 2019-10-14 PROCEDURE — 93000 ELECTROCARDIOGRAM COMPLETE: CPT | Performed by: INTERNAL MEDICINE

## 2019-10-14 PROCEDURE — 4040F PNEUMOC VAC/ADMIN/RCVD: CPT | Performed by: INTERNAL MEDICINE

## 2019-10-14 RX ORDER — ACETAMINOPHEN 500 MG
500 TABLET ORAL PRN
COMMUNITY

## 2020-02-19 RX ORDER — METOPROLOL SUCCINATE 25 MG/1
TABLET, EXTENDED RELEASE ORAL
Qty: 30 TABLET | Refills: 6 | Status: SHIPPED
Start: 2020-02-19 | End: 2020-09-11

## 2020-03-09 ENCOUNTER — HOSPITAL ENCOUNTER (OUTPATIENT)
Dept: CT IMAGING | Age: 75
Discharge: HOME | End: 2020-03-11
Payer: MEDICARE

## 2020-03-09 PROCEDURE — 71250 CT THORAX DX C-: CPT

## 2020-03-25 RX ORDER — APIXABAN 5 MG/1
TABLET, FILM COATED ORAL
Qty: 60 TABLET | Refills: 5 | Status: SHIPPED
Start: 2020-03-25 | End: 2020-04-17

## 2020-04-17 RX ORDER — APIXABAN 5 MG/1
TABLET, FILM COATED ORAL
Qty: 60 TABLET | Refills: 5 | Status: SHIPPED
Start: 2020-04-17 | End: 2020-10-12

## 2020-09-11 RX ORDER — METOPROLOL SUCCINATE 25 MG/1
TABLET, EXTENDED RELEASE ORAL
Qty: 90 TABLET | Refills: 3 | Status: SHIPPED
Start: 2020-09-11 | End: 2021-09-01

## 2020-10-01 ENCOUNTER — HOSPITAL ENCOUNTER (OUTPATIENT)
Dept: CT IMAGING | Age: 75
Discharge: HOME | End: 2020-10-03
Payer: MEDICARE

## 2020-10-01 PROCEDURE — 71250 CT THORAX DX C-: CPT

## 2020-10-12 RX ORDER — APIXABAN 5 MG/1
TABLET, FILM COATED ORAL
Qty: 60 TABLET | Refills: 5 | Status: SHIPPED
Start: 2020-10-12 | End: 2021-04-22

## 2020-11-20 ENCOUNTER — OFFICE VISIT (OUTPATIENT)
Dept: CARDIOLOGY CLINIC | Age: 75
End: 2020-11-20
Payer: MEDICARE

## 2020-11-20 VITALS
HEIGHT: 62 IN | RESPIRATION RATE: 16 BRPM | HEART RATE: 67 BPM | BODY MASS INDEX: 27.75 KG/M2 | WEIGHT: 150.8 LBS | DIASTOLIC BLOOD PRESSURE: 60 MMHG | SYSTOLIC BLOOD PRESSURE: 102 MMHG

## 2020-11-20 PROCEDURE — 3017F COLORECTAL CA SCREEN DOC REV: CPT | Performed by: INTERNAL MEDICINE

## 2020-11-20 PROCEDURE — 1036F TOBACCO NON-USER: CPT | Performed by: INTERNAL MEDICINE

## 2020-11-20 PROCEDURE — 1090F PRES/ABSN URINE INCON ASSESS: CPT | Performed by: INTERNAL MEDICINE

## 2020-11-20 PROCEDURE — 1123F ACP DISCUSS/DSCN MKR DOCD: CPT | Performed by: INTERNAL MEDICINE

## 2020-11-20 PROCEDURE — 3023F SPIROM DOC REV: CPT | Performed by: INTERNAL MEDICINE

## 2020-11-20 PROCEDURE — 93000 ELECTROCARDIOGRAM COMPLETE: CPT | Performed by: INTERNAL MEDICINE

## 2020-11-20 PROCEDURE — G8926 SPIRO NO PERF OR DOC: HCPCS | Performed by: INTERNAL MEDICINE

## 2020-11-20 PROCEDURE — G8417 CALC BMI ABV UP PARAM F/U: HCPCS | Performed by: INTERNAL MEDICINE

## 2020-11-20 PROCEDURE — 4040F PNEUMOC VAC/ADMIN/RCVD: CPT | Performed by: INTERNAL MEDICINE

## 2020-11-20 PROCEDURE — 99213 OFFICE O/P EST LOW 20 MIN: CPT | Performed by: INTERNAL MEDICINE

## 2020-11-20 PROCEDURE — G8400 PT W/DXA NO RESULTS DOC: HCPCS | Performed by: INTERNAL MEDICINE

## 2020-11-20 PROCEDURE — G8484 FLU IMMUNIZE NO ADMIN: HCPCS | Performed by: INTERNAL MEDICINE

## 2020-11-20 PROCEDURE — G8427 DOCREV CUR MEDS BY ELIG CLIN: HCPCS | Performed by: INTERNAL MEDICINE

## 2020-11-23 NOTE — PROGRESS NOTES
OFFICE VISIT        PRIMARY CARE PHYSICIAN:    Thelma Osborn III, DO       ALLERGIES / SENSITIVITIES:    Allergies   Allergen Reactions    Ciprofloxacin Swelling     Oral      Prevnar 13 [Pneumococcal 13-Amanda Conj Vacc] Rash        REVIEWED MEDICATIONS:      Current Outpatient Medications:     fluticasone-umeclidin-vilant (TRELEGY ELLIPTA) 100-62.5-25 MCG/INH AEPB, inhale 1 puff by mouth and INTO THE LUNGS once daily, Disp: 60 each, Rfl: 3    ELIQUIS 5 MG TABS tablet, take 1 tablet by mouth twice a day, Disp: 60 tablet, Rfl: 5    metoprolol succinate (TOPROL XL) 25 MG extended release tablet, take 1 tablet by mouth once daily, Disp: 90 tablet, Rfl: 3    acetaminophen (TYLENOL) 500 MG tablet, Take 500 mg by mouth as needed for Pain or Fever, Disp: , Rfl:     Multiple Vitamin (MULTI-VITAMIN DAILY PO), Take by mouth every other day , Disp: , Rfl:     Cholecalciferol (VITAMIN D3) 2000 units CAPS, Take 2 capsules by mouth every morning , Disp: , Rfl:     ramipril (ALTACE) 5 MG capsule, Take 5 mg by mouth every morning , Disp: , Rfl:     albuterol sulfate  (90 Base) MCG/ACT inhaler, Inhale 2 puffs into the lungs 4 times daily, Disp: , Rfl:       S: REASON FOR VISIT:    Paroxysmal atrial fibrillation and valvular heart disease. Hitesh Marie is a pleasant, 78-year-old lady as described below. She has COPD and follows up with Dr. Magali Chapa (Pulmonology) in that regard. She denies chest pain. She denies any worsening exertional dyspnea. She denies orthopnea, PND's or significant/worsening lower extremity swelling. She denies any recent palpitations. She denies dizziness, presyncope or syncope.         REVIEW OF SYSTEMS:    Constitutional: no fevers or chills or fatigue  HEENT: denies changes in hearing or vision, No difficulty swallowing  Endocrine: no weight changes  Musculoskeletal: no arthralgias or myalgias  Skin: denies rashes or itching or lesions but admits to easy bruising  Heme/Lymph: denies bleeding  Heart: as above  Lungs: as above  GI: denies abdominal pain, nausea, vomiting, diarrhea, rectal bleeding, tarry stools  : denies hematuria, dysuria  Psych: denies mood change, anxiety, depression. Neuro: denies numbness, tingling, tremors.                   CARDIOVASCULAR HISTORY:         1. Ex heavy smoker up top 2 ppd on and off for 40 years quit in 2002  2. HTN  3. RBBB/LAFB  4. Paroxysmal atrial fibrillation, symptomatic, March 27, 2019, on [address]  5.   2-D echo March 27, 2019. Normal left ventricular size, wall thickness, wall motion, and systolic function. Ejection fraction is visually estimated at 65%. There is doppler evidence of stage II diastolic dysfunction. Mildly dilated right ventricle. Right ventricle global systolic function is normal . Focal calcification mitral valve leaflets. Mild mitral annular calcification. Mild mitral stenosis. Mild mitral regurgitation is present. The aortic valve appears mildly sclerotic. No hemodynamically significant aortic stenosis is present. Mild aortic regurgitation is noted. Aortic root is sclerotic and calcified.  Mild to moderate tricuspid regurgitation. Moderate pulmonary hypertension. 6.  Hypercholesterolemia.      PAST MEDICAL HISTORY  1. As under cardiovascular history. 2.    Multiple pulmonary nodules noted on CTA of chest done on 03/15/2018:  Stable on follow-up CT scan of chest done on 03/12/2019.  3.    COPD/Emphysema  4.    R ankle fracture (\"shattered ankle secondary to falling on ice) chronic ankle pain s/p bilateral ankle injections  5. Back pain with sciatica s/p injection  6. Large hiatal hernia. 7.    GERD  8. Varicose veins  9. Pneumonia in 2018 requiring home O2 for a period of time (later discontinued)    10. Appendectomy, partial hysterectomy  11. S/p Pessary    12.   On estrogen replacement      FAMILY HISTORY  Noncontributory     SOCIAL HISTORY  Ex heavy smoker up top 2 ppd on and off for 40 years quit in 9892  Denies ETOH/Illicit Drugs       O:  COMPLETE PHYSICAL EXAM:      /60   Pulse 67   Resp 16   Ht 5' 2\" (1.575 m)   Wt 150 lb 12.8 oz (68.4 kg)   BMI 27.58 kg/m²      General:          Well-developed, well-nourished lady in no distress. HEENT:           Normocephalic and atraumatic head. No JVD. No carotid bruits. Carotid upstrokes normal bilaterally. No thyromegaly. Sclerae not icteric. No xanthelasmas. Mucous membranes moist.  Chest:              Symmetrical and nontender. No deformities. Lungs:             Diminished breath sounds in the right lower lung field. No crackles or wheezes heard. Heart:              Normal S1 and S2. No S3 or S4. No murmurs or rubs. Abdomen:        Soft, nontender without organomegaly or masses. No bruits. Normal bowel sounds. Extremities:     1+ pitting peritibial ankle edema on the right. No edema on the left. Varicosities noted. Pulses palpable. Skin:                Warm and dry. Normal turgor. No rashes or ulcers noted. Neurologic:      Oriented x3. No motor or sensory deficits detected. REVIEW OF DIAGNOSTIC TESTS:    1. Blood tests from 10/1/2020 reviewed: BUN 22, creatinine 0.83, GFR >60, potassium 4.5, LFT's normal, total cholesterol 214, triglycerides 108, HDL 77, , TSH normal.  CBC unremarkable. 2.  CT scan of the chest done on 10/1/2020 was reported as interval resolution of the previously identified 6 mm ground glass pulmonary nodule within the right upper lobe consistent with a resolved inflammatory or infectious process with no new pulmonary nodules identified. Multiple stable solid lower lobe pulmonary nodules unchanged since 3/15/2018 and therefore considered reliably benign and do not warrant further imaging follow up. Right middle lobe scarring and atelectasis,unchanged since 2019. Moderate hiatal hernia. Severe coronary artery atherosclerosis.     3.  EKG done today showed sinus rhythm with a heart rate of 67 bpm with left anterior fascicular block and right bundle branch block:  Unchanged. ASSESSMENT / DIAGNOSIS:   1. Paroxysmal atrial fibrillation:  Patient maintaining sinus rhythm. 2. Anticoagulated on Eliquis. 3. RBBB/LAFB  4. Hx HTN:  Adequately controlled. 5. Ex heavy smoker  6. COPD/Emphysema  7. Multiple bilateral pulmonary nodules. 8. Large hiatal hernia with GERD  9. History of chronic back pain with sciatica, status post injections. 10. BLE varicose veins  11. Hx R ankle fracture  12. Valvular heart disease with mild-to-moderate tricuspid regurgitation, mild aortic regurgitation, mild mitral stenosis and mild mitral regurgitation with moderate pulmonary hypertension on echocardiogram in 09/2019. 13. History of pneumonia. 14. Status post pessary. 15. Hypercholesterolemia (with a high HDL). 16. Severe coronary atherosclerosis. TREATMENT PLAN:  1. Reassure. 2.  Consider statin therapy. 3.  Consider Lexiscan nuclear stress test.  4.  Follow up in 1 year. 5.  Consider repeat echocardiogram for follow up valvular disease in 1 year. Steffanie  Erlinda Fernandes.  [place] [phone]

## 2021-04-22 RX ORDER — APIXABAN 5 MG/1
TABLET, FILM COATED ORAL
Qty: 60 TABLET | Refills: 5 | Status: SHIPPED
Start: 2021-04-22 | End: 2021-10-21

## 2021-09-01 RX ORDER — METOPROLOL SUCCINATE 25 MG/1
TABLET, EXTENDED RELEASE ORAL
Qty: 90 TABLET | Refills: 3 | Status: SHIPPED
Start: 2021-09-01 | End: 2022-08-26

## 2021-10-21 RX ORDER — APIXABAN 5 MG/1
TABLET, FILM COATED ORAL
Qty: 60 TABLET | Refills: 5 | Status: SHIPPED
Start: 2021-10-21 | End: 2022-05-05 | Stop reason: SDUPTHER

## 2021-10-30 ENCOUNTER — HOSPITAL ENCOUNTER (OUTPATIENT)
Dept: CT IMAGING | Age: 76
Discharge: HOME | End: 2021-11-01
Payer: MEDICARE

## 2021-10-30 DIAGNOSIS — R91.1 LUNG NODULE: ICD-10-CM

## 2021-10-30 PROCEDURE — 71250 CT THORAX DX C-: CPT

## 2022-05-05 RX ORDER — APIXABAN 5 MG/1
TABLET, FILM COATED ORAL
Qty: 60 TABLET | Refills: 5 | OUTPATIENT
Start: 2022-05-05

## 2022-06-17 ENCOUNTER — OFFICE VISIT (OUTPATIENT)
Dept: CARDIOLOGY CLINIC | Age: 77
End: 2022-06-17
Payer: MEDICARE

## 2022-06-17 VITALS
BODY MASS INDEX: 27.86 KG/M2 | HEART RATE: 65 BPM | RESPIRATION RATE: 16 BRPM | HEIGHT: 62 IN | WEIGHT: 151.4 LBS | OXYGEN SATURATION: 96 % | DIASTOLIC BLOOD PRESSURE: 60 MMHG | SYSTOLIC BLOOD PRESSURE: 122 MMHG

## 2022-06-17 DIAGNOSIS — Z87.39 H/O LOW BACK PAIN: ICD-10-CM

## 2022-06-17 DIAGNOSIS — I38 VHD (VALVULAR HEART DISEASE): ICD-10-CM

## 2022-06-17 DIAGNOSIS — K21.9 HIATAL HERNIA WITH GASTROESOPHAGEAL REFLUX: ICD-10-CM

## 2022-06-17 DIAGNOSIS — I44.4 LAFB (LEFT ANTERIOR FASCICULAR BLOCK): ICD-10-CM

## 2022-06-17 DIAGNOSIS — Z87.01 H/O: PNEUMONIA: ICD-10-CM

## 2022-06-17 DIAGNOSIS — Z79.01 ANTICOAGULATED BY ANTICOAGULATION TREATMENT: ICD-10-CM

## 2022-06-17 DIAGNOSIS — E78.00 HYPERCHOLESTEREMIA: ICD-10-CM

## 2022-06-17 DIAGNOSIS — I27.20 PULMONARY HTN (HCC): ICD-10-CM

## 2022-06-17 DIAGNOSIS — I25.10 CORONARY ARTERY DISEASE INVOLVING NATIVE CORONARY ARTERY OF NATIVE HEART WITHOUT ANGINA PECTORIS: ICD-10-CM

## 2022-06-17 DIAGNOSIS — K44.9 HIATAL HERNIA WITH GASTROESOPHAGEAL REFLUX: ICD-10-CM

## 2022-06-17 DIAGNOSIS — I10 ESSENTIAL HYPERTENSION: ICD-10-CM

## 2022-06-17 DIAGNOSIS — I48.0 PAF (PAROXYSMAL ATRIAL FIBRILLATION) (HCC): Primary | ICD-10-CM

## 2022-06-17 DIAGNOSIS — I83.893 VARICOSE VEINS OF BOTH LEGS WITH EDEMA: ICD-10-CM

## 2022-06-17 DIAGNOSIS — I45.10 RBBB (RIGHT BUNDLE BRANCH BLOCK): ICD-10-CM

## 2022-06-17 DIAGNOSIS — J44.9 CHRONIC OBSTRUCTIVE PULMONARY DISEASE, UNSPECIFIED COPD TYPE (HCC): ICD-10-CM

## 2022-06-17 DIAGNOSIS — R91.8 PULMONARY NODULES: ICD-10-CM

## 2022-06-17 DIAGNOSIS — Z87.891 EX-SMOKER: ICD-10-CM

## 2022-06-17 PROCEDURE — G8417 CALC BMI ABV UP PARAM F/U: HCPCS | Performed by: INTERNAL MEDICINE

## 2022-06-17 PROCEDURE — 93000 ELECTROCARDIOGRAM COMPLETE: CPT | Performed by: INTERNAL MEDICINE

## 2022-06-17 PROCEDURE — 99214 OFFICE O/P EST MOD 30 MIN: CPT | Performed by: INTERNAL MEDICINE

## 2022-06-17 PROCEDURE — 1036F TOBACCO NON-USER: CPT | Performed by: INTERNAL MEDICINE

## 2022-06-17 PROCEDURE — 1090F PRES/ABSN URINE INCON ASSESS: CPT | Performed by: INTERNAL MEDICINE

## 2022-06-17 PROCEDURE — G8400 PT W/DXA NO RESULTS DOC: HCPCS | Performed by: INTERNAL MEDICINE

## 2022-06-17 PROCEDURE — G8427 DOCREV CUR MEDS BY ELIG CLIN: HCPCS | Performed by: INTERNAL MEDICINE

## 2022-06-17 PROCEDURE — 3017F COLORECTAL CA SCREEN DOC REV: CPT | Performed by: INTERNAL MEDICINE

## 2022-06-17 PROCEDURE — 1123F ACP DISCUSS/DSCN MKR DOCD: CPT | Performed by: INTERNAL MEDICINE

## 2022-06-17 PROCEDURE — 3023F SPIROM DOC REV: CPT | Performed by: INTERNAL MEDICINE

## 2022-06-17 NOTE — PROGRESS NOTES
OFFICE VISIT        PRIMARY CARE PHYSICIAN:    Huey Wall, III, DO         ALLERGIES / SENSITIVITIES:    Allergies   Allergen Reactions    Ciprofloxacin Swelling     Oral      Prevnar 13 [Pneumococcal 13-Amanda Conj Vacc] Rash          REVIEWED MEDICATIONS:      Current Outpatient Medications:     apixaban (ELIQUIS) 5 MG TABS tablet, take 1 tablet by mouth twice a day, Disp: 60 tablet, Rfl: 3    TRELEGY ELLIPTA 100-62.5-25 MCG/INH AEPB, inhale 1 puff by mouth and INTO THE LUNGS once daily, Disp: 60 each, Rfl: 5    metoprolol succinate (TOPROL XL) 25 MG extended release tablet, take 1 tablet by mouth once daily, Disp: 90 tablet, Rfl: 3    acetaminophen (TYLENOL) 500 MG tablet, Take 500 mg by mouth as needed for Pain or Fever, Disp: , Rfl:     Multiple Vitamin (MULTI-VITAMIN DAILY PO), Take by mouth every other day , Disp: , Rfl:     Cholecalciferol (VITAMIN D3) 2000 units CAPS, Take 2 capsules by mouth every morning , Disp: , Rfl:     ramipril (ALTACE) 5 MG capsule, Take 5 mg by mouth every morning , Disp: , Rfl:     albuterol sulfate  (90 Base) MCG/ACT inhaler, Inhale 2 puffs into the lungs 4 times daily, Disp: , Rfl:       S: REASON FOR VISIT:    Paroxysmal atrial fibrillation, valvular heart disease. Daniel Augustine is a pleasant, 69-year-old lady with cardiovascular history as describe below. She has severe Stage III COPD (by GOLD classification). She follows with Dr. David San (Pulmonology) in that regard. She denies chest pain. She has chronic exertional dyspnea, which has not worsened:  She denies orthopnea, PND's or significant/worsening lower extremity swelling.  She denies any recent palpitations  She denies dizziness, presyncope or syncope        REVIEW OF SYSTEMS:    Constitutional: no fevers or chills or fatigue  HEENT: denies changes in hearing or vision, No difficulty swallowing  Endocrine: no weight changes  Musculoskeletal: no arthralgias or myalgias  Skin: denies rashes or itching or lesions but admits to easy bruising  Heme/Lymph: denies bleeding  Heart: as above  Lungs: as above  GI: denies abdominal pain, nausea, vomiting, diarrhea, rectal bleeding, tarry stools  : denies hematuria, dysuria  Psych: denies mood change, anxiety, depression. Neuro: denies numbness, tingling, tremors.         CARDIOVASCULAR HISTORY:         1. Ex heavy smoker up top 2 ppd on and off for 40 years quit in 2002. 2. HTN  3. RBBB/LAFB  4.   Paroxysmal atrial fibrillation, symptomatic, March 27, 2019, on [address] [place] [address] Road  5.   2-D echo March 27, 2019.  Normal left ventricular size, wall thickness, wall motion, and systolic function. Ejection fraction is visually estimated at 65%. There is doppler evidence of stage II diastolic dysfunction. Mildly dilated right ventricle. Right ventricle global systolic function is normal . Focal calcification mitral valve leaflets. Mild mitral annular calcification. Mild mitral stenosis. Mild mitral regurgitation is present. The aortic valve appears mildly sclerotic. No hemodynamically significant aortic stenosis is present. Mild aortic regurgitation is noted. Aortic root is sclerotic and calcified.  Mild to moderate tricuspid regurgitation. Moderate pulmonary hypertension. 6.  Hypercholesterolemia. 7.  Coronary calcifications noted on CT scan of the chest.      PAST MEDICAL HISTORY  1.    As under cardiovascular history. 2.    Multiple pulmonary nodules noted on CTA of chest done on 03/15/2018:  Stable on follow-up CT scan of chest done on 03/12/2019. 3.    COPD (Stage 3 severe COPD) by GOLD Classification/emphysema    4.    R ankle fracture (\"shattered ankle secondary to falling on ice) chronic ankle pain s/p bilateral ankle injections  5.    Back pain with sciatica s/p injection  6.    Large hiatal hernia. 7.    GERD. 8.    Varicose veins. 9.    Pneumonia in 2018 requiring home O2 for a period of time (later discontinued).     10.  Appendectomy, partial hysterectomy.   11.  S/p Pessary.   12.  History of estrogen replacement therapy.       FAMILY HISTORY  Noncontributory     SOCIAL HISTORY  Ex heavy smoker up top 2 ppd on and off for 40 years quit in 2002. Denies ETOH/Illicit Drugs.        O:  COMPLETE PHYSICAL EXAM:      /60 (Site: Right Upper Arm, Position: Sitting, Cuff Size: Medium Adult)   Pulse 65   Resp 16   Ht 5' 2\" (1.575 m)   Wt 151 lb 6.4 oz (68.7 kg)   SpO2 96%   BMI 27.69 kg/m²      General:          Well-developed, well-nourished lady in no distress. HEENT:           Normocephalic and atraumatic head. No JVD. No carotid bruits. Carotid upstrokes normal bilaterally.  No thyromegaly.                       Sclerae not icteric.  No xanthelasmas.  Mucous membranes moist.  Chest:              Symmetrical and nontender.  No deformities. Lungs:             Diminished breath sounds in the lower lung fields.  No crackles or wheezes heard.    Heart:              Normal S1 and S2.  No S3 or S4.  No murmurs or rubs. Abdomen:        Soft, nontender without organomegaly or masses.  No bruits.  Normal bowel sounds. Extremities:     Trace pitting peritibial and ankle edema on the right. No edema on the left. Varicosities/varicose veins noted.  Pulses   palpable. Skin:                Warm and dry.  Normal turgor. No rashes or ulcers noted. Neurologic:      Oriented x3.  No motor or sensory deficits detected. REVIEW OF DIAGNOSTIC TESTS:    1. Blood tests from 12/13/2021 reviewed:  CBC unremarkable. 2.  BUN 22, creatinine 0.87, GFR 66, potassium 4.6, LFT's unremarkable. Total cholesterol 183, triglycerides 87, HDL 58, , vitamin D 25 Hydroxy normal   3. EKG done today showed sinus rhythm with a heart rate of 65 bpm with occasional PVC's with left ventricular fascicular block: Unchanged. ASSESSMENT / DIAGNOSIS:   1. Paroxysmal atrial fibrillation:  Patient maintaining sinus rhythm.    2. Anticoagulated on Eliquis. 3. RBBB/LAFB  4.  Hx HTN:  Adequately controlled. 5. Ex heavy smoker  6. COPD/Emphysema  7. Multiple bilateral pulmonary nodules. 8. Large hiatal hernia with GERD  9. History of chronic back pain with sciatica, status post injections. 10. BLE varicose veins  11. Hx R ankle fracture  12. Valvular heart disease with mild-to-moderate tricuspid regurgitation, mild aortic regurgitation, mild mitral stenosis and mild mitral regurgitation with moderate pulmonary hypertension on echocardiogram in 09/2019. 13. History of pneumonia. 14. Status post pessary. 15. Hypercholesterolemia (with a high HDL). 16.  Coronary calcifications noted on CT scan of the chest.        TREATMENT PLAN:  1. Reassure. 2.  Continue current cardiac medications. 3.  Consider statin therapy. 4.  Echocardiogram for follow up valvular heart disease. 5.  Follow up with Cardiology in 1 year or on a prn basis. Steffanie Alford.  [facility] [address]  [phone]

## 2022-08-26 RX ORDER — METOPROLOL SUCCINATE 25 MG/1
TABLET, EXTENDED RELEASE ORAL
Qty: 90 TABLET | Refills: 3 | Status: SHIPPED | OUTPATIENT
Start: 2022-08-26

## 2023-01-29 ENCOUNTER — HOSPITAL ENCOUNTER (OUTPATIENT)
Dept: CT IMAGING | Age: 78
Discharge: HOME | End: 2023-01-31
Payer: MEDICARE

## 2023-01-29 DIAGNOSIS — R91.8 PULMONARY NODULES: ICD-10-CM

## 2023-01-29 PROCEDURE — 71250 CT THORAX DX C-: CPT

## 2023-03-07 ENCOUNTER — TELEPHONE (OUTPATIENT)
Dept: CARDIOLOGY | Age: 78
End: 2023-03-07

## 2023-04-03 ENCOUNTER — HOSPITAL ENCOUNTER (OUTPATIENT)
Dept: CARDIOLOGY | Age: 78
Discharge: HOME | End: 2023-04-03
Payer: MEDICARE

## 2023-04-03 DIAGNOSIS — I38 VHD (VALVULAR HEART DISEASE): ICD-10-CM

## 2023-04-03 DIAGNOSIS — I27.20 PULMONARY HTN (HCC): ICD-10-CM

## 2023-04-03 LAB
LV EF: 63 %
LVEF MODALITY: NORMAL

## 2023-04-03 PROCEDURE — 93306 TTE W/DOPPLER COMPLETE: CPT

## 2023-07-24 ENCOUNTER — OFFICE VISIT (OUTPATIENT)
Dept: CARDIOLOGY CLINIC | Age: 78
End: 2023-07-24
Payer: MEDICARE

## 2023-07-24 VITALS
DIASTOLIC BLOOD PRESSURE: 52 MMHG | RESPIRATION RATE: 16 BRPM | OXYGEN SATURATION: 93 % | HEART RATE: 59 BPM | SYSTOLIC BLOOD PRESSURE: 118 MMHG | HEIGHT: 62 IN | WEIGHT: 147 LBS | BODY MASS INDEX: 27.05 KG/M2

## 2023-07-24 DIAGNOSIS — Z87.01 H/O: PNEUMONIA: ICD-10-CM

## 2023-07-24 DIAGNOSIS — I44.4 LAFB (LEFT ANTERIOR FASCICULAR BLOCK): ICD-10-CM

## 2023-07-24 DIAGNOSIS — I48.0 PAF (PAROXYSMAL ATRIAL FIBRILLATION) (HCC): Primary | ICD-10-CM

## 2023-07-24 DIAGNOSIS — E78.00 HYPERCHOLESTEREMIA: ICD-10-CM

## 2023-07-24 DIAGNOSIS — K44.9 HIATAL HERNIA WITH GASTROESOPHAGEAL REFLUX: ICD-10-CM

## 2023-07-24 DIAGNOSIS — J44.9 CHRONIC OBSTRUCTIVE PULMONARY DISEASE, UNSPECIFIED COPD TYPE (HCC): ICD-10-CM

## 2023-07-24 DIAGNOSIS — I25.10 CORONARY ARTERY DISEASE INVOLVING NATIVE CORONARY ARTERY OF NATIVE HEART WITHOUT ANGINA PECTORIS: ICD-10-CM

## 2023-07-24 DIAGNOSIS — Z87.39 H/O LOW BACK PAIN: ICD-10-CM

## 2023-07-24 DIAGNOSIS — Z87.891 EX-SMOKER: ICD-10-CM

## 2023-07-24 DIAGNOSIS — I27.20 PULMONARY HTN (HCC): ICD-10-CM

## 2023-07-24 DIAGNOSIS — K21.9 HIATAL HERNIA WITH GASTROESOPHAGEAL REFLUX: ICD-10-CM

## 2023-07-24 DIAGNOSIS — I35.1 NONRHEUMATIC AORTIC VALVE INSUFFICIENCY: ICD-10-CM

## 2023-07-24 DIAGNOSIS — I36.1 NONRHEUMATIC TRICUSPID VALVE REGURGITATION: ICD-10-CM

## 2023-07-24 DIAGNOSIS — R91.8 PULMONARY NODULES: ICD-10-CM

## 2023-07-24 DIAGNOSIS — I38 VHD (VALVULAR HEART DISEASE): ICD-10-CM

## 2023-07-24 DIAGNOSIS — Z79.01 ANTICOAGULATED BY ANTICOAGULATION TREATMENT: ICD-10-CM

## 2023-07-24 DIAGNOSIS — I10 ESSENTIAL HYPERTENSION: ICD-10-CM

## 2023-07-24 DIAGNOSIS — I83.893 VARICOSE VEINS OF BOTH LEGS WITH EDEMA: ICD-10-CM

## 2023-07-24 DIAGNOSIS — I45.10 RBBB (RIGHT BUNDLE BRANCH BLOCK): ICD-10-CM

## 2023-07-24 PROCEDURE — 3074F SYST BP LT 130 MM HG: CPT | Performed by: INTERNAL MEDICINE

## 2023-07-24 PROCEDURE — G8427 DOCREV CUR MEDS BY ELIG CLIN: HCPCS | Performed by: INTERNAL MEDICINE

## 2023-07-24 PROCEDURE — 93000 ELECTROCARDIOGRAM COMPLETE: CPT | Performed by: INTERNAL MEDICINE

## 2023-07-24 PROCEDURE — G8400 PT W/DXA NO RESULTS DOC: HCPCS | Performed by: INTERNAL MEDICINE

## 2023-07-24 PROCEDURE — 1036F TOBACCO NON-USER: CPT | Performed by: INTERNAL MEDICINE

## 2023-07-24 PROCEDURE — G8417 CALC BMI ABV UP PARAM F/U: HCPCS | Performed by: INTERNAL MEDICINE

## 2023-07-24 PROCEDURE — 1090F PRES/ABSN URINE INCON ASSESS: CPT | Performed by: INTERNAL MEDICINE

## 2023-07-24 PROCEDURE — 99214 OFFICE O/P EST MOD 30 MIN: CPT | Performed by: INTERNAL MEDICINE

## 2023-07-24 PROCEDURE — 1123F ACP DISCUSS/DSCN MKR DOCD: CPT | Performed by: INTERNAL MEDICINE

## 2023-07-24 PROCEDURE — 3023F SPIROM DOC REV: CPT | Performed by: INTERNAL MEDICINE

## 2023-07-24 PROCEDURE — 3078F DIAST BP <80 MM HG: CPT | Performed by: INTERNAL MEDICINE

## 2023-07-24 RX ORDER — ATORVASTATIN CALCIUM 10 MG
10 TABLET ORAL DAILY
Qty: 30 TABLET | Refills: 11 | Status: SHIPPED
Start: 2023-07-24 | End: 2023-07-24 | Stop reason: SDUPTHER

## 2023-07-24 RX ORDER — ATORVASTATIN CALCIUM 10 MG/1
10 TABLET, FILM COATED ORAL DAILY
Qty: 30 TABLET | Refills: 11 | Status: SHIPPED | OUTPATIENT
Start: 2023-07-24

## 2023-07-24 NOTE — PROGRESS NOTES
OFFICE VISIT        PRIMARY CARE PHYSICIAN:    Maritza Polanco, III, DO         ALLERGIES / SENSITIVITIES:    Allergies   Allergen Reactions    Ciprofloxacin Swelling     Oral      Prevnar 13 [Pneumococcal 13-Amanda Conj Vacc] Rash          REVIEWED MEDICATIONS:      Current Outpatient Medications:     LIPITOR 10 MG tablet, Take 1 tablet by mouth daily, Disp: 30 tablet, Rfl: 11    apixaban (ELIQUIS) 5 MG TABS tablet, take 1 tablet by mouth twice a day, Disp: 60 tablet, Rfl: 3    fluticasone-umeclidin-vilant (TRELEGY ELLIPTA) 100-62.5-25 MCG/ACT AEPB inhaler, inhale 1 puff by mouth and INTO THE LUNGS once daily, Disp: 60 each, Rfl: 5    metoprolol succinate (TOPROL XL) 25 MG extended release tablet, take 1 tablet by mouth once daily, Disp: 90 tablet, Rfl: 3    acetaminophen (TYLENOL) 500 MG tablet, Take 1 tablet by mouth as needed for Pain or Fever, Disp: , Rfl:     Multiple Vitamin (MULTI-VITAMIN DAILY PO), Take by mouth every other day , Disp: , Rfl:     Cholecalciferol (VITAMIN D3) 2000 units CAPS, Take 2 capsules by mouth every morning, Disp: , Rfl:     ramipril (ALTACE) 5 MG capsule, Take 1 capsule by mouth every morning, Disp: , Rfl:     albuterol sulfate  (90 Base) MCG/ACT inhaler, Inhale 2 puffs into the lungs 4 times daily, Disp: , Rfl:         S: REASON FOR VISIT:    Paroxysmal atrial fibrillation, valvular heart disease (aortic regurgitation), left anterior fascicular block and right bundle branch block. Allison Medrano is a pleasant, 71-year-old lady with cardiovascular history as described below. She has severe Stage 3 COPD (by GOLD classification). She follows with Dr. Giuseppe Dumont (Pulmonology) in that regard. She suffered from RSV infection in 12/2022. She denies chest pain. She has chronic exertional dyspnea, which has been stable and has not worsened. She denies orthopnea, PND's or significant/worsening lower extremity swelling. She denies any recent palpitations.  She denies dizziness, presyncope or

## 2023-08-28 RX ORDER — METOPROLOL SUCCINATE 25 MG/1
TABLET, EXTENDED RELEASE ORAL
Qty: 90 TABLET | Refills: 3 | Status: SHIPPED | OUTPATIENT
Start: 2023-08-28

## 2023-12-29 ENCOUNTER — TELEPHONE (OUTPATIENT)
Dept: CARDIOLOGY CLINIC | Age: 78
End: 2023-12-29

## 2023-12-29 NOTE — TELEPHONE ENCOUNTER
DR Dheeraj MENENDEZ CALLED REGARDING A CT SCAN OF HER CHEST. SHE SAID THAT THE HOSPITAL CALLED HER AND TOLD HER TO CALL YOUR OFFICE REGARDING A YEARLY CT SCAN. THEY TOLD HER THAT SHE NEEDS TO FIND OUT FROM YOU SO THE CT SCAN YOU WANT DONE CAN BE DONE WITH THE PULMONARY DOCTOR'S CT SCAN. I DO NOT SEE AN ORDER FOR A CT SCAN FROM YOU IN HER CHART OR ANY MENTION OF ONE IN HER OV NOTE. PLEASE ADVISE.

## 2024-02-01 ENCOUNTER — HOSPITAL ENCOUNTER (OUTPATIENT)
Dept: CT IMAGING | Age: 79
Discharge: HOME | End: 2024-02-03
Attending: INTERNAL MEDICINE
Payer: MEDICARE

## 2024-02-01 DIAGNOSIS — R91.8 PULMONARY NODULES: ICD-10-CM

## 2024-02-01 PROCEDURE — 71250 CT THORAX DX C-: CPT

## 2024-07-22 DIAGNOSIS — E78.00 HYPERCHOLESTEREMIA: ICD-10-CM

## 2024-07-22 RX ORDER — ATORVASTATIN CALCIUM 10 MG/1
10 TABLET, FILM COATED ORAL DAILY
Qty: 30 TABLET | Refills: 3 | Status: SHIPPED | OUTPATIENT
Start: 2024-07-22

## 2024-07-26 ENCOUNTER — OFFICE VISIT (OUTPATIENT)
Dept: CARDIOLOGY CLINIC | Age: 79
End: 2024-07-26

## 2024-07-26 VITALS
BODY MASS INDEX: 26.83 KG/M2 | HEART RATE: 63 BPM | DIASTOLIC BLOOD PRESSURE: 68 MMHG | SYSTOLIC BLOOD PRESSURE: 112 MMHG | HEIGHT: 62 IN | OXYGEN SATURATION: 95 % | RESPIRATION RATE: 20 BRPM | WEIGHT: 145.8 LBS

## 2024-07-26 DIAGNOSIS — I25.10 CORONARY ARTERY DISEASE INVOLVING NATIVE CORONARY ARTERY OF NATIVE HEART WITHOUT ANGINA PECTORIS: ICD-10-CM

## 2024-07-26 DIAGNOSIS — I35.1 NONRHEUMATIC AORTIC VALVE INSUFFICIENCY: ICD-10-CM

## 2024-07-26 DIAGNOSIS — I83.893 VARICOSE VEINS OF BOTH LEGS WITH EDEMA: ICD-10-CM

## 2024-07-26 DIAGNOSIS — I38 VHD (VALVULAR HEART DISEASE): ICD-10-CM

## 2024-07-26 DIAGNOSIS — I48.0 PAF (PAROXYSMAL ATRIAL FIBRILLATION) (HCC): Primary | ICD-10-CM

## 2024-07-26 DIAGNOSIS — Z87.891 EX-SMOKER: ICD-10-CM

## 2024-07-26 DIAGNOSIS — K44.9 HIATAL HERNIA WITH GASTROESOPHAGEAL REFLUX: ICD-10-CM

## 2024-07-26 DIAGNOSIS — I36.1 NONRHEUMATIC TRICUSPID VALVE REGURGITATION: ICD-10-CM

## 2024-07-26 DIAGNOSIS — Z87.01 H/O: PNEUMONIA: ICD-10-CM

## 2024-07-26 DIAGNOSIS — K21.9 HIATAL HERNIA WITH GASTROESOPHAGEAL REFLUX: ICD-10-CM

## 2024-07-26 DIAGNOSIS — I44.4 LAFB (LEFT ANTERIOR FASCICULAR BLOCK): ICD-10-CM

## 2024-07-26 DIAGNOSIS — J44.9 CHRONIC OBSTRUCTIVE PULMONARY DISEASE, UNSPECIFIED COPD TYPE (HCC): ICD-10-CM

## 2024-07-26 DIAGNOSIS — R91.8 PULMONARY NODULES: ICD-10-CM

## 2024-07-26 DIAGNOSIS — I27.20 PULMONARY HTN (HCC): ICD-10-CM

## 2024-07-26 DIAGNOSIS — E78.00 HYPERCHOLESTEREMIA: ICD-10-CM

## 2024-07-26 DIAGNOSIS — Z79.01 ANTICOAGULATED BY ANTICOAGULATION TREATMENT: ICD-10-CM

## 2024-07-26 DIAGNOSIS — I10 ESSENTIAL HYPERTENSION: ICD-10-CM

## 2024-07-26 DIAGNOSIS — I45.10 RBBB (RIGHT BUNDLE BRANCH BLOCK): ICD-10-CM

## 2024-07-26 DIAGNOSIS — Z87.39 H/O LOW BACK PAIN: ICD-10-CM

## 2024-07-26 RX ORDER — GABAPENTIN 300 MG/1
CAPSULE ORAL
COMMUNITY
Start: 2024-07-10

## 2024-07-26 NOTE — PROGRESS NOTES
OFFICE VISIT        PRIMARY CARE PHYSICIAN:    Jimbo Jameson III, DO         ALLERGIES / SENSITIVITIES:    Allergies   Allergen Reactions    Ciprofloxacin Swelling and Other (See Comments)     Oral      Prevnar 13 [Pneumococcal 13-Amanda Conj Vacc] Rash          REVIEWED MEDICATIONS:      Current Outpatient Medications:     gabapentin (NEURONTIN) 300 MG capsule, TAKE 1 CAPSULE BY MOUTH THREE TIMES DAILY FOR PAIN, Disp: , Rfl:     atorvastatin (LIPITOR) 10 MG tablet, Take 1 tablet by mouth daily, Disp: 30 tablet, Rfl: 3    fluticasone-umeclidin-vilant (TRELEGY ELLIPTA) 100-62.5-25 MCG/ACT AEPB inhaler, inhale 1 puff by mouth and INTO THE LUNGS once daily, Disp: 60 each, Rfl: 5    apixaban (ELIQUIS) 5 MG TABS tablet, take 1 tablet by mouth twice a day, Disp: 90 tablet, Rfl: 3    metoprolol succinate (TOPROL XL) 25 MG extended release tablet, take 1 tablet by mouth once daily, Disp: 90 tablet, Rfl: 3    Multiple Vitamin (MULTI-VITAMIN DAILY PO), Take by mouth every other day , Disp: , Rfl:     Cholecalciferol (VITAMIN D3) 2000 units CAPS, Take 2 capsules by mouth every morning, Disp: , Rfl:     ramipril (ALTACE) 5 MG capsule, Take 1 capsule by mouth every morning, Disp: , Rfl:     albuterol sulfate  (90 Base) MCG/ACT inhaler, Inhale 2 puffs into the lungs 4 times daily, Disp: , Rfl:         S: REASON FOR VISIT:    Paroxysmal atrial fibrillation, valvular heart disease (aortic regurgitation), left anterior fascicular block and right bundle branch blockRobin Lucio is a pleasant 78-year-old lady with cardiovascular history as below.  She also has severe stage III COPD (by Gold classification).  She follows with pulmonology/Dr. Nunez in that regard.  She has been stable from cardiac standpoint.  She tries to remain active.  She denies chest pain.  She denies worsening exertional dyspnea.  She denies orthopnea, PND's or significant lower extremity swelling.  She denies any recent palpitations.  She denies dizziness,

## 2024-08-24 NOTE — PLAN OF CARE
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

## 2024-08-26 RX ORDER — METOPROLOL SUCCINATE 25 MG/1
25 TABLET, EXTENDED RELEASE ORAL DAILY
Qty: 90 TABLET | Refills: 3 | Status: SHIPPED | OUTPATIENT
Start: 2024-08-26

## 2024-11-21 DIAGNOSIS — E78.00 HYPERCHOLESTEREMIA: ICD-10-CM

## 2024-11-22 ENCOUNTER — HOSPITAL ENCOUNTER (OUTPATIENT)
Dept: CT IMAGING | Age: 79
Discharge: HOME | End: 2024-11-22
Payer: MEDICARE

## 2024-11-22 DIAGNOSIS — R91.8 PULMONARY NODULES: ICD-10-CM

## 2024-11-22 PROCEDURE — 71250 CT THORAX DX C-: CPT

## 2024-11-25 RX ORDER — ATORVASTATIN CALCIUM 10 MG/1
10 TABLET, FILM COATED ORAL DAILY
Qty: 30 TABLET | Refills: 3 | Status: SHIPPED | OUTPATIENT
Start: 2024-11-25

## 2025-01-29 DIAGNOSIS — E78.00 HYPERCHOLESTEREMIA: ICD-10-CM

## 2025-01-29 RX ORDER — ATORVASTATIN CALCIUM 10 MG/1
10 TABLET, FILM COATED ORAL DAILY
Qty: 90 TABLET | Refills: 1 | Status: SHIPPED | OUTPATIENT
Start: 2025-01-29

## 2025-04-21 RX ORDER — APIXABAN 5 MG/1
5 TABLET, FILM COATED ORAL 2 TIMES DAILY
Qty: 60 TABLET | Refills: 3 | Status: SHIPPED | OUTPATIENT
Start: 2025-04-21

## 2025-04-30 PROBLEM — L65.9 LOSS OF HAIR: Status: ACTIVE | Noted: 2025-04-30

## 2025-04-30 PROBLEM — Z78.9: Status: ACTIVE | Noted: 2025-04-30

## 2025-04-30 PROBLEM — J40 BRONCHITIS: Status: ACTIVE | Noted: 2025-04-30

## 2025-04-30 PROBLEM — R79.89 HIGH THYROID STIMULATING HORMONE (TSH) LEVEL: Status: ACTIVE | Noted: 2025-04-30

## 2025-06-23 ENCOUNTER — TELEPHONE (OUTPATIENT)
Dept: CARDIOLOGY CLINIC | Age: 80
End: 2025-06-23

## 2025-08-25 ENCOUNTER — HOSPITAL ENCOUNTER (EMERGENCY)
Age: 80
Discharge: HOME | End: 2025-08-25
Attending: STUDENT IN AN ORGANIZED HEALTH CARE EDUCATION/TRAINING PROGRAM
Payer: MEDICARE

## 2025-08-25 VITALS
TEMPERATURE: 98.6 F | RESPIRATION RATE: 18 BRPM | WEIGHT: 145 LBS | HEART RATE: 99 BPM | SYSTOLIC BLOOD PRESSURE: 109 MMHG | OXYGEN SATURATION: 93 % | DIASTOLIC BLOOD PRESSURE: 46 MMHG | BODY MASS INDEX: 26.52 KG/M2

## 2025-08-25 DIAGNOSIS — N30.00 ACUTE CYSTITIS WITHOUT HEMATURIA: Primary | ICD-10-CM

## 2025-08-25 DIAGNOSIS — E78.00 HYPERCHOLESTEREMIA: ICD-10-CM

## 2025-08-25 DIAGNOSIS — R79.89 ELEVATED SERUM CREATININE: ICD-10-CM

## 2025-08-25 LAB
ALBUMIN SERPL-MCNC: 4.2 G/DL (ref 3.5–5.2)
ALP SERPL-CCNC: 92 U/L (ref 35–104)
ALT SERPL-CCNC: 19 U/L (ref 0–35)
ANION GAP SERPL CALCULATED.3IONS-SCNC: 12 MMOL/L (ref 7–16)
AST SERPL-CCNC: 36 U/L (ref 0–35)
BACTERIA URNS QL MICRO: ABNORMAL
BASOPHILS # BLD: 0.06 K/UL (ref 0–0.2)
BASOPHILS NFR BLD: 1 % (ref 0–2)
BILIRUB SERPL-MCNC: 0.2 MG/DL (ref 0–1.2)
BILIRUB UR QL STRIP: NEGATIVE
BUN SERPL-MCNC: 23 MG/DL (ref 8–23)
CALCIUM SERPL-MCNC: 9.3 MG/DL (ref 8.8–10.2)
CHLORIDE SERPL-SCNC: 107 MMOL/L (ref 98–107)
CLARITY UR: ABNORMAL
CO2 SERPL-SCNC: 23 MMOL/L (ref 22–29)
COLOR UR: YELLOW
CREAT SERPL-MCNC: 1.4 MG/DL (ref 0.5–1)
EOSINOPHIL # BLD: 0.24 K/UL (ref 0.05–0.5)
EOSINOPHILS RELATIVE PERCENT: 2 % (ref 0–6)
ERYTHROCYTE [DISTWIDTH] IN BLOOD BY AUTOMATED COUNT: 13.6 % (ref 11.5–15)
GFR, ESTIMATED: 38 ML/MIN/1.73M2
GLUCOSE SERPL-MCNC: 102 MG/DL (ref 74–99)
GLUCOSE UR STRIP-MCNC: NEGATIVE MG/DL
HCT VFR BLD AUTO: 41.2 % (ref 34–48)
HGB BLD-MCNC: 13.5 G/DL (ref 11.5–15.5)
HGB UR QL STRIP.AUTO: ABNORMAL
IMM GRANULOCYTES # BLD AUTO: 0.04 K/UL (ref 0–0.58)
IMM GRANULOCYTES NFR BLD: 0 % (ref 0–5)
KETONES UR STRIP-MCNC: NEGATIVE MG/DL
LEUKOCYTE ESTERASE UR QL STRIP: ABNORMAL
LYMPHOCYTES NFR BLD: 1.86 K/UL (ref 1.5–4)
LYMPHOCYTES RELATIVE PERCENT: 18 % (ref 20–42)
MCH RBC QN AUTO: 32.2 PG (ref 26–35)
MCHC RBC AUTO-ENTMCNC: 32.8 G/DL (ref 32–34.5)
MCV RBC AUTO: 98.3 FL (ref 80–99.9)
MONOCYTES NFR BLD: 0.74 K/UL (ref 0.1–0.95)
MONOCYTES NFR BLD: 7 % (ref 2–12)
NEUTROPHILS NFR BLD: 72 % (ref 43–80)
NEUTS SEG NFR BLD: 7.7 K/UL (ref 1.8–7.3)
NITRITE UR QL STRIP: NEGATIVE
PH UR STRIP: 7 [PH] (ref 5–8)
PLATELET # BLD AUTO: 257 K/UL (ref 130–450)
PMV BLD AUTO: 10.2 FL (ref 7–12)
POTASSIUM SERPL-SCNC: 4.9 MMOL/L (ref 3.5–5.1)
PROT SERPL-MCNC: 7.5 G/DL (ref 6.4–8.3)
PROT UR STRIP-MCNC: ABNORMAL MG/DL
RBC # BLD AUTO: 4.19 M/UL (ref 3.5–5.5)
RBC #/AREA URNS HPF: ABNORMAL /HPF
SODIUM SERPL-SCNC: 142 MMOL/L (ref 136–145)
SP GR UR STRIP: 1.01 (ref 1–1.03)
UROBILINOGEN UR STRIP-ACNC: 0.2 EU/DL (ref 0–1)
WBC #/AREA URNS HPF: ABNORMAL /HPF
WBC OTHER # BLD: 10.6 K/UL (ref 4.5–11.5)

## 2025-08-25 PROCEDURE — 81001 URINALYSIS AUTO W/SCOPE: CPT

## 2025-08-25 PROCEDURE — 85025 COMPLETE CBC W/AUTO DIFF WBC: CPT

## 2025-08-25 PROCEDURE — 99283 EMERGENCY DEPT VISIT LOW MDM: CPT

## 2025-08-25 PROCEDURE — 80053 COMPREHEN METABOLIC PANEL: CPT

## 2025-08-25 PROCEDURE — 87086 URINE CULTURE/COLONY COUNT: CPT

## 2025-08-25 PROCEDURE — 87077 CULTURE AEROBIC IDENTIFY: CPT

## 2025-08-25 RX ORDER — GRANULES FOR ORAL 3 G/1
3 POWDER ORAL ONCE
Status: DISCONTINUED | OUTPATIENT
Start: 2025-08-25 | End: 2025-08-25 | Stop reason: HOSPADM

## 2025-08-25 RX ORDER — ATORVASTATIN CALCIUM 10 MG/1
10 TABLET, FILM COATED ORAL DAILY
Qty: 90 TABLET | Refills: 1 | Status: SHIPPED | OUTPATIENT
Start: 2025-08-25

## 2025-08-25 RX ORDER — GRANULES FOR ORAL 3 G/1
3 POWDER ORAL
Qty: 2 EACH | Refills: 0 | Status: SHIPPED | OUTPATIENT
Start: 2025-08-28 | End: 2025-09-01

## 2025-08-25 RX ORDER — METOPROLOL SUCCINATE 25 MG/1
25 TABLET, EXTENDED RELEASE ORAL DAILY
Qty: 90 TABLET | Refills: 3 | Status: SHIPPED | OUTPATIENT
Start: 2025-08-25

## 2025-08-25 ASSESSMENT — PAIN SCALES - GENERAL: PAINLEVEL_OUTOF10: 0

## 2025-08-25 ASSESSMENT — LIFESTYLE VARIABLES: HOW MANY STANDARD DRINKS CONTAINING ALCOHOL DO YOU HAVE ON A TYPICAL DAY: PATIENT DOES NOT DRINK

## 2025-08-25 ASSESSMENT — PAIN - FUNCTIONAL ASSESSMENT: PAIN_FUNCTIONAL_ASSESSMENT: 0-10

## 2025-08-28 LAB
MICROORGANISM SPEC CULT: ABNORMAL
SERVICE CMNT-IMP: ABNORMAL
SPECIMEN DESCRIPTION: ABNORMAL

## 2025-08-31 ENCOUNTER — HOSPITAL ENCOUNTER (EMERGENCY)
Age: 80
Discharge: HOME | End: 2025-08-31
Attending: EMERGENCY MEDICINE
Payer: MEDICARE

## 2025-08-31 VITALS
RESPIRATION RATE: 20 BRPM | DIASTOLIC BLOOD PRESSURE: 67 MMHG | HEART RATE: 74 BPM | SYSTOLIC BLOOD PRESSURE: 110 MMHG | TEMPERATURE: 97.3 F | OXYGEN SATURATION: 94 %

## 2025-08-31 DIAGNOSIS — R30.0 DYSURIA: Primary | ICD-10-CM

## 2025-08-31 PROCEDURE — 2580000003 HC RX 258

## 2025-08-31 PROCEDURE — 6360000002 HC RX W HCPCS

## 2025-08-31 PROCEDURE — 99284 EMERGENCY DEPT VISIT MOD MDM: CPT

## 2025-08-31 PROCEDURE — 96365 THER/PROPH/DIAG IV INF INIT: CPT

## 2025-08-31 RX ADMIN — GENTAMICIN SULFATE 280 MG: 40 INJECTION, SOLUTION INTRAMUSCULAR; INTRAVENOUS at 13:32
